# Patient Record
Sex: FEMALE | Race: WHITE | NOT HISPANIC OR LATINO | URBAN - METROPOLITAN AREA
[De-identification: names, ages, dates, MRNs, and addresses within clinical notes are randomized per-mention and may not be internally consistent; named-entity substitution may affect disease eponyms.]

---

## 2018-08-24 RX ORDER — SODIUM CHLORIDE 9 MG/ML
20 INJECTION, SOLUTION INTRAVENOUS CONTINUOUS
Status: DISCONTINUED | OUTPATIENT
Start: 2018-08-27 | End: 2018-08-30 | Stop reason: HOSPADM

## 2018-08-27 ENCOUNTER — HOSPITAL ENCOUNTER (OUTPATIENT)
Dept: INFUSION CENTER | Facility: CLINIC | Age: 33
Discharge: HOME | End: 2018-08-27
Payer: COMMERCIAL

## 2018-08-27 VITALS
RESPIRATION RATE: 16 BRPM | SYSTOLIC BLOOD PRESSURE: 138 MMHG | HEART RATE: 63 BPM | DIASTOLIC BLOOD PRESSURE: 85 MMHG | TEMPERATURE: 98.8 F

## 2018-08-27 PROCEDURE — 96365 THER/PROPH/DIAG IV INF INIT: CPT

## 2018-08-27 RX ORDER — TERBINAFINE HYDROCHLORIDE 250 MG/1
250 TABLET ORAL SEE ADMIN INSTRUCTIONS
COMMUNITY
End: 2019-03-21

## 2018-08-27 RX ORDER — BISOPROLOL FUMARATE 5 MG/1
5 TABLET ORAL DAILY
COMMUNITY
End: 2019-03-21

## 2018-08-27 RX ADMIN — SODIUM CHLORIDE 500 MG: 0.9 INJECTION, SOLUTION INTRAVENOUS at 14:21

## 2018-08-27 RX ADMIN — SODIUM CHLORIDE 20 ML/HR: 9 INJECTION, SOLUTION INTRAVENOUS at 14:20

## 2018-08-27 NOTE — PROGRESS NOTES
Tolerated IV Methyprednisolone  Has metallic taste in mouth but otherwise feels fine  Aware of next appointment   AVS given to patient

## 2018-08-27 NOTE — PROGRESS NOTES
Patient arrived on unit for first Methylprednisolone  Patient has been having issues with her eyes-pain, burning, dryness  Answered any questions prior to initiating infusion

## 2018-09-06 RX ORDER — SODIUM CHLORIDE 9 MG/ML
20 INJECTION, SOLUTION INTRAVENOUS CONTINUOUS
Status: DISCONTINUED | OUTPATIENT
Start: 2018-09-07 | End: 2018-09-10 | Stop reason: HOSPADM

## 2018-09-07 ENCOUNTER — HOSPITAL ENCOUNTER (OUTPATIENT)
Dept: INFUSION CENTER | Facility: CLINIC | Age: 33
Discharge: HOME | End: 2018-09-07
Payer: COMMERCIAL

## 2018-09-07 VITALS
RESPIRATION RATE: 16 BRPM | TEMPERATURE: 98.6 F | DIASTOLIC BLOOD PRESSURE: 88 MMHG | SYSTOLIC BLOOD PRESSURE: 136 MMHG | HEART RATE: 70 BPM

## 2018-09-07 PROCEDURE — 96365 THER/PROPH/DIAG IV INF INIT: CPT

## 2018-09-07 RX ADMIN — SODIUM CHLORIDE 500 MG: 0.9 INJECTION, SOLUTION INTRAVENOUS at 11:04

## 2018-09-07 RX ADMIN — SODIUM CHLORIDE 20 ML/HR: 0.9 INJECTION, SOLUTION INTRAVENOUS at 11:04

## 2018-09-13 RX ORDER — SODIUM CHLORIDE 9 MG/ML
20 INJECTION, SOLUTION INTRAVENOUS CONTINUOUS
Status: DISCONTINUED | OUTPATIENT
Start: 2018-09-14 | End: 2018-09-17 | Stop reason: HOSPADM

## 2018-09-14 ENCOUNTER — HOSPITAL ENCOUNTER (OUTPATIENT)
Dept: INFUSION CENTER | Facility: CLINIC | Age: 33
Discharge: HOME | End: 2018-09-14
Payer: COMMERCIAL

## 2018-09-14 VITALS
DIASTOLIC BLOOD PRESSURE: 82 MMHG | HEART RATE: 66 BPM | SYSTOLIC BLOOD PRESSURE: 123 MMHG | RESPIRATION RATE: 16 BRPM | TEMPERATURE: 98.9 F

## 2018-09-14 PROCEDURE — 96365 THER/PROPH/DIAG IV INF INIT: CPT

## 2018-09-14 RX ADMIN — SODIUM CHLORIDE 20 ML/HR: 9 INJECTION, SOLUTION INTRAVENOUS at 11:15

## 2018-09-14 RX ADMIN — SODIUM CHLORIDE 500 MG: 0.9 INJECTION, SOLUTION INTRAVENOUS at 11:20

## 2018-09-14 NOTE — PLAN OF CARE
Problem: Potential for Falls  Goal: Patient will remain free of falls  INTERVENTIONS:  - Assess patient frequently for physical needs  -  Identify cognitive and physical deficits and behaviors that affect risk of falls    -  [place] fall precautions as indicated by assessment   - Educate patient/family on patient safety including physical limitations  - Instruct patient to call for assistance with activity based on assessment  - Modify environment to reduce risk of injury  - Consider OT/PT consult to assist with strengthening/mobility   Outcome: Progressing

## 2018-09-20 RX ORDER — SODIUM CHLORIDE 9 MG/ML
20 INJECTION, SOLUTION INTRAVENOUS CONTINUOUS
Status: DISCONTINUED | OUTPATIENT
Start: 2018-09-21 | End: 2018-09-24 | Stop reason: HOSPADM

## 2018-09-21 ENCOUNTER — HOSPITAL ENCOUNTER (OUTPATIENT)
Dept: INFUSION CENTER | Facility: CLINIC | Age: 33
Discharge: HOME | End: 2018-09-21
Payer: COMMERCIAL

## 2018-09-21 VITALS
DIASTOLIC BLOOD PRESSURE: 68 MMHG | SYSTOLIC BLOOD PRESSURE: 126 MMHG | HEART RATE: 88 BPM | TEMPERATURE: 98.9 F | RESPIRATION RATE: 18 BRPM

## 2018-09-21 PROCEDURE — 96365 THER/PROPH/DIAG IV INF INIT: CPT

## 2018-09-21 RX ADMIN — SODIUM CHLORIDE 20 ML/HR: 9 INJECTION, SOLUTION INTRAVENOUS at 10:50

## 2018-09-21 RX ADMIN — SODIUM CHLORIDE 500 MG: 0.9 INJECTION, SOLUTION INTRAVENOUS at 10:50

## 2018-09-21 NOTE — PLAN OF CARE
Problem: Potential for Falls  Goal: Patient will remain free of falls  INTERVENTIONS:  - Assess patient frequently for physical needs  -  Identify cognitive and physical deficits and behaviors that affect risk of falls    -  [place] fall precautions as indicated by assessment   - Educate patient/family on patient safety including physical limitations  - Instruct patient to call for assistance with activity based on assessment  - Modify environment to reduce risk of injury  - Consider OT/PT consult to assist with strengthening/mobility   Outcome: Progressing

## 2018-09-27 RX ORDER — SODIUM CHLORIDE 9 MG/ML
20 INJECTION, SOLUTION INTRAVENOUS CONTINUOUS
Status: DISCONTINUED | OUTPATIENT
Start: 2018-09-28 | End: 2018-10-01 | Stop reason: HOSPADM

## 2018-09-28 ENCOUNTER — HOSPITAL ENCOUNTER (OUTPATIENT)
Dept: INFUSION CENTER | Facility: CLINIC | Age: 33
Discharge: HOME | End: 2018-09-28
Payer: COMMERCIAL

## 2018-09-28 VITALS
SYSTOLIC BLOOD PRESSURE: 137 MMHG | HEART RATE: 87 BPM | DIASTOLIC BLOOD PRESSURE: 87 MMHG | RESPIRATION RATE: 18 BRPM | TEMPERATURE: 97.2 F

## 2018-09-28 PROCEDURE — 96365 THER/PROPH/DIAG IV INF INIT: CPT

## 2018-09-28 RX ADMIN — SODIUM CHLORIDE 250 MG: 0.9 INJECTION, SOLUTION INTRAVENOUS at 11:12

## 2018-09-28 RX ADMIN — SODIUM CHLORIDE 20 ML/HR: 0.9 INJECTION, SOLUTION INTRAVENOUS at 11:11

## 2018-09-28 NOTE — PROGRESS NOTES
Pt arrived to unit without complaint, denies any delayed s/e from previous IV Methyl infusions  Pt received 250mg IV Metyhlprednisolone as prescribed today  Pt tolerated well without adverse reaction  AVS provided, including scheduled appts for next 3 infusions

## 2018-09-28 NOTE — PLAN OF CARE
Problem: Potential for Falls  Goal: Patient will remain free of falls  INTERVENTIONS:  - Assess patient frequently for physical needs  -  Identify cognitive and physical deficits and behaviors that affect risk of falls    -  [place] fall precautions as indicated by assessment   - Educate patient/family on patient safety including physical limitations  - Instruct patient to call for assistance with activity based on assessment  - Modify environment to reduce risk of injury  - Consider OT/PT consult to assist with strengthening/mobility   Outcome: Progressing

## 2018-10-04 RX ORDER — SODIUM CHLORIDE 9 MG/ML
20 INJECTION, SOLUTION INTRAVENOUS CONTINUOUS
Status: DISCONTINUED | OUTPATIENT
Start: 2018-10-05 | End: 2018-10-08 | Stop reason: HOSPADM

## 2018-10-05 ENCOUNTER — HOSPITAL ENCOUNTER (OUTPATIENT)
Dept: INFUSION CENTER | Facility: CLINIC | Age: 33
Discharge: HOME | End: 2018-10-05
Payer: COMMERCIAL

## 2018-10-05 PROCEDURE — 96365 THER/PROPH/DIAG IV INF INIT: CPT

## 2018-10-05 RX ADMIN — SODIUM CHLORIDE 20 ML/HR: 0.9 INJECTION, SOLUTION INTRAVENOUS at 14:53

## 2018-10-05 RX ADMIN — SODIUM CHLORIDE 250 MG: 0.9 INJECTION, SOLUTION INTRAVENOUS at 14:53

## 2018-10-05 NOTE — PLAN OF CARE
Problem: Potential for Falls  Goal: Patient will remain free of falls  INTERVENTIONS:  - Assess patient frequently for physical needs  -  Identify cognitive and physical deficits and behaviors that affect risk of falls    -  [place] fall precautions as indicated by assessment   - Educate patient/family on patient safety including physical limitations  - Instruct patient to call for assistance with activity based on assessment  - Modify environment to reduce risk of injury  - Consider OT/PT consult to assist with strengthening/mobility   Outcome: Progressing

## 2018-10-05 NOTE — PROGRESS NOTES
Pt without complaint, tolerated IV Methyl well without adverse reaction, left unit in stable condition without question or concern  Pt declines AVS today

## 2018-10-11 RX ORDER — SODIUM CHLORIDE 9 MG/ML
20 INJECTION, SOLUTION INTRAVENOUS CONTINUOUS
Status: DISCONTINUED | OUTPATIENT
Start: 2018-10-12 | End: 2018-10-15 | Stop reason: HOSPADM

## 2018-10-12 ENCOUNTER — HOSPITAL ENCOUNTER (OUTPATIENT)
Dept: INFUSION CENTER | Facility: CLINIC | Age: 33
Discharge: HOME | End: 2018-10-12
Payer: COMMERCIAL

## 2018-10-12 VITALS
DIASTOLIC BLOOD PRESSURE: 75 MMHG | SYSTOLIC BLOOD PRESSURE: 149 MMHG | RESPIRATION RATE: 18 BRPM | HEART RATE: 61 BPM | TEMPERATURE: 99.1 F

## 2018-10-12 PROCEDURE — 96365 THER/PROPH/DIAG IV INF INIT: CPT

## 2018-10-12 RX ADMIN — SODIUM CHLORIDE 250 MG: 0.9 INJECTION, SOLUTION INTRAVENOUS at 14:35

## 2018-10-12 RX ADMIN — SODIUM CHLORIDE 20 ML/HR: 0.9 INJECTION, SOLUTION INTRAVENOUS at 14:35

## 2018-10-18 RX ORDER — SODIUM CHLORIDE 9 MG/ML
20 INJECTION, SOLUTION INTRAVENOUS CONTINUOUS
Status: DISCONTINUED | OUTPATIENT
Start: 2018-10-19 | End: 2018-10-22 | Stop reason: HOSPADM

## 2018-10-19 ENCOUNTER — HOSPITAL ENCOUNTER (OUTPATIENT)
Dept: INFUSION CENTER | Facility: CLINIC | Age: 33
Discharge: HOME | End: 2018-10-19
Payer: COMMERCIAL

## 2018-10-19 VITALS
SYSTOLIC BLOOD PRESSURE: 128 MMHG | DIASTOLIC BLOOD PRESSURE: 92 MMHG | RESPIRATION RATE: 18 BRPM | HEART RATE: 80 BPM | TEMPERATURE: 98.3 F

## 2018-10-19 PROCEDURE — 96365 THER/PROPH/DIAG IV INF INIT: CPT

## 2018-10-19 RX ADMIN — SODIUM CHLORIDE 20 ML/HR: 9 INJECTION, SOLUTION INTRAVENOUS at 15:10

## 2018-10-19 RX ADMIN — SODIUM CHLORIDE 250 MG: 0.9 INJECTION, SOLUTION INTRAVENOUS at 15:16

## 2018-10-19 NOTE — PLAN OF CARE
Problem: Potential for Falls  Goal: Patient will remain free of falls  INTERVENTIONS:  - Assess patient frequently for physical needs  -  Identify cognitive and physical deficits and behaviors that affect risk of falls    -  [place] fall precautions as indicated by assessment   - Educate patient/family on patient safety including physical limitations  - Instruct patient to call for assistance with activity based on assessment  - Modify environment to reduce risk of injury  - Consider OT/PT consult to assist with strengthening/mobility   Outcome: Progressing

## 2018-10-19 NOTE — PROGRESS NOTES
Patient tolerated infusion well  Denies any discomfort  Pt does not have future infusion appointments at this time

## 2019-04-19 ENCOUNTER — EVALUATION (OUTPATIENT)
Dept: PHYSICAL THERAPY | Facility: REHABILITATION | Age: 34
End: 2019-04-19
Payer: COMMERCIAL

## 2019-04-19 DIAGNOSIS — R42 DIZZINESS: Primary | ICD-10-CM

## 2019-04-19 PROCEDURE — 97162 PT EVAL MOD COMPLEX 30 MIN: CPT

## 2019-05-03 ENCOUNTER — TELEPHONE (OUTPATIENT)
Dept: NEUROLOGY | Facility: CLINIC | Age: 34
End: 2019-05-03

## 2019-05-03 ENCOUNTER — OFFICE VISIT (OUTPATIENT)
Dept: NEUROLOGY | Facility: CLINIC | Age: 34
End: 2019-05-03
Payer: COMMERCIAL

## 2019-05-03 VITALS
HEIGHT: 66 IN | RESPIRATION RATE: 16 BRPM | BODY MASS INDEX: 19.77 KG/M2 | WEIGHT: 123 LBS | HEART RATE: 77 BPM | DIASTOLIC BLOOD PRESSURE: 75 MMHG | SYSTOLIC BLOOD PRESSURE: 152 MMHG

## 2019-05-03 DIAGNOSIS — R53.1 GENERALIZED WEAKNESS: ICD-10-CM

## 2019-05-03 DIAGNOSIS — E05.00 GRAVES DISEASE: ICD-10-CM

## 2019-05-03 DIAGNOSIS — H53.9 VISION CHANGES: ICD-10-CM

## 2019-05-03 DIAGNOSIS — R26.89 IMBALANCE: Primary | ICD-10-CM

## 2019-05-03 DIAGNOSIS — R25.3 MUSCLE TWITCHING: ICD-10-CM

## 2019-05-03 PROCEDURE — 99204 OFFICE O/P NEW MOD 45 MIN: CPT | Performed by: PSYCHIATRY & NEUROLOGY

## 2019-05-03 RX ORDER — GABAPENTIN 300 MG/1
300 CAPSULE ORAL
Qty: 30 CAPSULE | Refills: 0 | Status: SHIPPED | OUTPATIENT
Start: 2019-05-03

## 2019-05-08 ENCOUNTER — DOCUMENTATION (OUTPATIENT)
Dept: NEUROLOGY | Facility: CLINIC | Age: 34
End: 2019-05-08

## 2019-05-16 RX ORDER — CLINDAMYCIN PHOSPHATE 10 UG/ML
LOTION TOPICAL
COMMUNITY

## 2019-05-16 RX ORDER — LEVOTHYROXINE AND LIOTHYRONINE 19; 4.5 UG/1; UG/1
TABLET ORAL
COMMUNITY
End: 2019-05-17 | Stop reason: ALTCHOICE

## 2019-05-16 RX ORDER — DEXLANSOPRAZOLE 60 MG/1
CAPSULE, DELAYED RELEASE ORAL
COMMUNITY
End: 2019-05-17 | Stop reason: ALTCHOICE

## 2019-05-16 RX ORDER — TINIDAZOLE 500 MG/1
TABLET ORAL
COMMUNITY
End: 2019-05-17 | Stop reason: ALTCHOICE

## 2019-05-16 RX ORDER — LEVOTHYROXINE SODIUM 75 UG/1
CAPSULE ORAL
Refills: 0 | COMMUNITY
Start: 2019-04-26

## 2019-05-16 RX ORDER — FOLIC/B6/B12/OM3/DHA/EPA/BSITO 1-12.5-0.5
CAPSULE ORAL
Refills: 0 | COMMUNITY
Start: 2019-04-23 | End: 2019-05-17 | Stop reason: ALTCHOICE

## 2019-05-16 RX ORDER — TERBINAFINE HYDROCHLORIDE 250 MG/1
TABLET ORAL
COMMUNITY
End: 2019-05-17 | Stop reason: ALTCHOICE

## 2019-05-16 RX ORDER — LIDOCAINE AND TETRACAINE 70; 70 MG/G; MG/G
CREAM TOPICAL
COMMUNITY
Start: 2018-10-02 | End: 2019-05-17 | Stop reason: ALTCHOICE

## 2019-05-16 RX ORDER — ERYTHROMYCIN 5 MG/G
OINTMENT OPHTHALMIC
COMMUNITY
Start: 2018-11-01 | End: 2019-05-17 | Stop reason: ALTCHOICE

## 2019-05-16 RX ORDER — NEOMYCIN SULFATE, POLYMYXIN B SULFATE AND GRAMICIDIN 1.75; 10000; .025 MG/ML; [USP'U]/ML; MG/ML
SOLUTION/ DROPS OPHTHALMIC
COMMUNITY
Start: 2018-09-16 | End: 2019-05-17 | Stop reason: ALTCHOICE

## 2019-05-16 RX ORDER — METHIMAZOLE 5 MG/1
TABLET ORAL
COMMUNITY
End: 2019-05-17 | Stop reason: ALTCHOICE

## 2019-05-16 RX ORDER — LEVOFLOXACIN 750 MG/1
TABLET ORAL
Refills: 0 | COMMUNITY
Start: 2019-03-30 | End: 2019-05-17 | Stop reason: ALTCHOICE

## 2019-05-16 RX ORDER — PREDNISONE 10 MG/1
TABLET ORAL
COMMUNITY
End: 2019-05-17 | Stop reason: ALTCHOICE

## 2019-05-16 RX ORDER — BETAMETHASONE DIPROPIONATE 0.5 MG/G
OINTMENT TOPICAL
COMMUNITY
End: 2019-05-17 | Stop reason: ALTCHOICE

## 2019-05-16 RX ORDER — OFLOXACIN 3 MG/ML
SOLUTION/ DROPS OPHTHALMIC
COMMUNITY
End: 2019-05-17 | Stop reason: ALTCHOICE

## 2019-05-16 RX ORDER — FENOPROFEN CALCIUM 200 MG/1
CAPSULE ORAL
COMMUNITY
End: 2019-05-17 | Stop reason: ALTCHOICE

## 2019-05-16 RX ORDER — DICLOFENAC POTASSIUM 50 MG/1
TABLET, FILM COATED ORAL
COMMUNITY
End: 2019-05-17 | Stop reason: ALTCHOICE

## 2019-05-17 ENCOUNTER — OFFICE VISIT (OUTPATIENT)
Dept: UROLOGY | Facility: MEDICAL CENTER | Age: 34
End: 2019-05-17
Payer: COMMERCIAL

## 2019-05-17 ENCOUNTER — TELEPHONE (OUTPATIENT)
Dept: UROLOGY | Facility: MEDICAL CENTER | Age: 34
End: 2019-05-17

## 2019-05-17 VITALS
DIASTOLIC BLOOD PRESSURE: 60 MMHG | SYSTOLIC BLOOD PRESSURE: 100 MMHG | BODY MASS INDEX: 19.61 KG/M2 | HEART RATE: 100 BPM | HEIGHT: 66 IN | WEIGHT: 122 LBS

## 2019-05-17 DIAGNOSIS — R31.9 HEMATURIA, UNSPECIFIED TYPE: Primary | ICD-10-CM

## 2019-05-17 DIAGNOSIS — N30.90 CYSTITIS: ICD-10-CM

## 2019-05-17 PROCEDURE — 99203 OFFICE O/P NEW LOW 30 MIN: CPT | Performed by: UROLOGY

## 2019-05-17 RX ORDER — NITROFURANTOIN 25; 75 MG/1; MG/1
100 CAPSULE ORAL 2 TIMES DAILY
Qty: 10 CAPSULE | Refills: 0 | Status: SHIPPED | OUTPATIENT
Start: 2019-05-17

## 2019-05-17 RX ORDER — FLUCONAZOLE 200 MG/1
TABLET ORAL
Refills: 0 | COMMUNITY
Start: 2019-05-13

## 2019-05-17 RX ORDER — DOXYCYCLINE HYCLATE 100 MG/1
100 CAPSULE ORAL 2 TIMES DAILY
Refills: 0 | COMMUNITY
Start: 2019-05-13

## 2019-05-17 RX ORDER — CYCLOSPORINE 0.5 MG/ML
EMULSION OPHTHALMIC
Refills: 0 | COMMUNITY
Start: 2019-05-10

## 2019-06-10 ENCOUNTER — TELEPHONE (OUTPATIENT)
Dept: UROLOGY | Facility: MEDICAL CENTER | Age: 34
End: 2019-06-10

## 2019-06-13 ENCOUNTER — TELEPHONE (OUTPATIENT)
Dept: UROLOGY | Facility: MEDICAL CENTER | Age: 34
End: 2019-06-13

## 2021-03-15 ENCOUNTER — TELEPHONE (OUTPATIENT)
Dept: NEUROLOGY | Facility: CLINIC | Age: 36
End: 2021-03-15

## 2021-03-15 NOTE — TELEPHONE ENCOUNTER
1st Attempt  Left message to reschedule 04/01 appointment with Dr Rosalind Sterling  She will be covering residency that day       06/02 & 06/03 Held for reschedules

## 2021-03-25 NOTE — TELEPHONE ENCOUNTER
Called patient LVM to patient that her appointment has been moved to 4/22/2021 with Jennfier Fuentes @ 8:30am in [place] location  I also informed the patient if  appointment date time and location does not work for her to call back to reschedule

## 2021-05-21 ENCOUNTER — TRANSCRIBE ORDERS (OUTPATIENT)
Dept: SCHEDULING | Age: 36
End: 2021-05-21

## 2021-05-21 DIAGNOSIS — Z01.818 ENCOUNTER FOR OTHER PREPROCEDURAL EXAMINATION: Primary | ICD-10-CM

## 2021-05-22 ENCOUNTER — APPOINTMENT (OUTPATIENT)
Dept: LAB | Age: 36
End: 2021-05-22
Attending: OPHTHALMOLOGY
Payer: COMMERCIAL

## 2021-05-22 DIAGNOSIS — Z01.818 ENCOUNTER FOR OTHER PREPROCEDURAL EXAMINATION: ICD-10-CM

## 2021-05-22 PROCEDURE — U0003 INFECTIOUS AGENT DETECTION BY NUCLEIC ACID (DNA OR RNA); SEVERE ACUTE RESPIRATORY SYNDROME CORONAVIRUS 2 (SARS-COV-2) (CORONAVIRUS DISEASE [COVID-19]), AMPLIFIED PROBE TECHNIQUE, MAKING USE OF HIGH THROUGHPUT TECHNOLOGIES AS DESCRIBED BY CMS-2020-01-R: HCPCS

## 2021-05-23 LAB — SARS-COV-2 RNA RESP QL NAA+PROBE: NEGATIVE

## 2021-05-24 ENCOUNTER — ANESTHESIA EVENT (OUTPATIENT)
Dept: SURGERY | Facility: HOSPITAL | Age: 36
Setting detail: HOSPITAL OUTPATIENT SURGERY
End: 2021-05-24
Payer: COMMERCIAL

## 2021-05-24 NOTE — ANESTHESIA PREPROCEDURE EVALUATION
"Relevant Problems   No relevant active problems       Anesthesia ROS/MED HX    Anesthesia History - neg  Pulmonary - neg  Neuro/Psych - neg  Cardiovascular- neg   Covid19 Test Reviewed  Comments: Ecchymosis below L eye  Hematological - neg  GI/Hepatic- neg  Musculoskeletal- neg  Renal Disease- neg  Endo/Other   Hypothyroidism (s/p ablation for Graves)       There is no problem list on file for this patient.       Past Medical History:   Diagnosis Date   • Graves disease    • Thyroid eye disease        Past Surgical History:   Procedure Laterality Date   • ORBITAL RECONSTRUCTION     • POLYPECTOMY         Allergies   Allergen Reactions   • Epinephrine Palpitations   • Vancomycin Rash       Current Facility-Administered Medications   Medication Dose Route Frequency   • clindamycin  600 mg intravenous Once   • sodium chloride 0.9 %  80 mL/hr intravenous Continuous       Prior to Admission medications    Medication Sig Start Date End Date Taking? Authorizing Provider   levothyroxine sodium (TIROSINT) 75 mcg capsule Take 75 mcg by mouth daily.   Yes ProviderNancy MD   liothyronine (CYTOMEL) 25 mcg tablet Take 25 mcg by mouth daily.   Yes Provider, MD Nancy       Vitals:    05/26/21 1258 05/26/21 1259   BP: (!) 148/61    Pulse: 81    Resp: 18    Temp: 36.6 °C (97.8 °F)    TempSrc: Temporal    SpO2: 100%    Weight:  59.4 kg (131 lb)   Height:  1.651 m (5' 5\")       BP Readings from Last 3 Encounters:   05/26/21 (!) 148/61       CBC Results    No lab values to display.         BMP Results    No lab values to display.               The patient does not have an active anticoagulation episode.    Lab Results   Component Value Date    HCGPREGUR Negative 05/26/2021       No orders to display           Physical Exam    Airway   Mallampati: II   TM distance: >3 FB   Neck ROM: full  Cardiovascular - normal   Rhythm: regular   Rate: normalPulmonary - normal  Other Findings   Ecchymosis below L eye  Dental - " normal        Anesthesia Plan    Plan: MAC    Technique: MAC     Lines and Monitors: PIV     Airway: natural airway / supplemental oxygen   ASA 2  Blood Products:   Use of Blood Products Discussed: No   Anesthetic plan and risks discussed with: patient  Induction:    intravenous   Postop Plan:   Patient Disposition: phase II then home   Pain Management: IV analgesics  Comments:    Plan: Consented for both MAC, and GA back-up. Patient understands that as part of MAC, it is possible to have experience of transient awareness under anesthesia. Discussed associated risks including, but not limited to, sore throat, nausea/vomiting, oral injury, unexpected drug reactions, big swings in blood pressure, pulmonary complications, heart attack, stroke, and death.   Patient advised of risks, benefits, and alternatives, and all questions/concerns answered.

## 2021-05-26 ENCOUNTER — ANESTHESIA (OUTPATIENT)
Dept: SURGERY | Facility: HOSPITAL | Age: 36
Setting detail: HOSPITAL OUTPATIENT SURGERY
End: 2021-05-26
Payer: COMMERCIAL

## 2021-05-26 ENCOUNTER — HOSPITAL ENCOUNTER (OUTPATIENT)
Facility: HOSPITAL | Age: 36
Setting detail: HOSPITAL OUTPATIENT SURGERY
Discharge: HOME | End: 2021-05-26
Attending: OPHTHALMOLOGY | Admitting: OPHTHALMOLOGY
Payer: COMMERCIAL

## 2021-05-26 VITALS
HEIGHT: 65 IN | RESPIRATION RATE: 20 BRPM | OXYGEN SATURATION: 99 % | HEART RATE: 71 BPM | DIASTOLIC BLOOD PRESSURE: 55 MMHG | TEMPERATURE: 97 F | WEIGHT: 131 LBS | BODY MASS INDEX: 21.83 KG/M2 | SYSTOLIC BLOOD PRESSURE: 95 MMHG

## 2021-05-26 LAB
B-HCG UR QL: NEGATIVE
POCT TEST: NORMAL

## 2021-05-26 PROCEDURE — 08BNXZZ EXCISION OF RIGHT UPPER EYELID, EXTERNAL APPROACH: ICD-10-PCS | Performed by: OPHTHALMOLOGY

## 2021-05-26 PROCEDURE — 36000013 HC OR LEVEL 3 EA ADDL MIN

## 2021-05-26 PROCEDURE — 63700000 HC SELF-ADMINISTRABLE DRUG: Performed by: OPHTHALMOLOGY

## 2021-05-26 PROCEDURE — 71000011 HC PACU PHASE 1 EA ADDL MIN: Performed by: OPHTHALMOLOGY

## 2021-05-26 PROCEDURE — 08SRXZZ REPOSITION LEFT LOWER EYELID, EXTERNAL APPROACH: ICD-10-PCS | Performed by: OPHTHALMOLOGY

## 2021-05-26 PROCEDURE — 37000002 HC ANESTHESIA MAC: Performed by: OPHTHALMOLOGY

## 2021-05-26 PROCEDURE — 27200000 HC STERILE SUPPLY: Performed by: OPHTHALMOLOGY

## 2021-05-26 PROCEDURE — 25000000 HC PHARMACY GENERAL: Performed by: OPHTHALMOLOGY

## 2021-05-26 PROCEDURE — 63600000 HC DRUGS/DETAIL CODE: Performed by: OPHTHALMOLOGY

## 2021-05-26 PROCEDURE — 08RQX7Z REPLACEMENT OF RIGHT LOWER EYELID WITH AUTOLOGOUS TISSUE SUBSTITUTE, EXTERNAL APPROACH: ICD-10-PCS | Performed by: OPHTHALMOLOGY

## 2021-05-26 PROCEDURE — 08BPXZZ EXCISION OF LEFT UPPER EYELID, EXTERNAL APPROACH: ICD-10-PCS | Performed by: OPHTHALMOLOGY

## 2021-05-26 PROCEDURE — 63600000 HC DRUGS/DETAIL CODE: Performed by: NURSE ANESTHETIST, CERTIFIED REGISTERED

## 2021-05-26 PROCEDURE — 36000013 HC OR LEVEL 3 EA ADDL MIN: Performed by: OPHTHALMOLOGY

## 2021-05-26 PROCEDURE — 25000000 HC PHARMACY GENERAL: Performed by: NURSE ANESTHETIST, CERTIFIED REGISTERED

## 2021-05-26 PROCEDURE — 71000002 HC PACU PHASE 2 INITIAL 30MIN: Performed by: OPHTHALMOLOGY

## 2021-05-26 PROCEDURE — 36000003 HC OR LEVEL 3 INITIAL 30MIN: Performed by: OPHTHALMOLOGY

## 2021-05-26 PROCEDURE — 08SQXZZ REPOSITION RIGHT LOWER EYELID, EXTERNAL APPROACH: ICD-10-PCS | Performed by: OPHTHALMOLOGY

## 2021-05-26 PROCEDURE — 080N0ZZ ALTERATION OF RIGHT UPPER EYELID, OPEN APPROACH: ICD-10-PCS | Performed by: OPHTHALMOLOGY

## 2021-05-26 PROCEDURE — 08RRX7Z REPLACEMENT OF LEFT LOWER EYELID WITH AUTOLOGOUS TISSUE SUBSTITUTE, EXTERNAL APPROACH: ICD-10-PCS | Performed by: OPHTHALMOLOGY

## 2021-05-26 PROCEDURE — 25800000 HC PHARMACY IV SOLUTIONS: Performed by: STUDENT IN AN ORGANIZED HEALTH CARE EDUCATION/TRAINING PROGRAM

## 2021-05-26 PROCEDURE — 080P0ZZ ALTERATION OF LEFT UPPER EYELID, OPEN APPROACH: ICD-10-PCS | Performed by: OPHTHALMOLOGY

## 2021-05-26 PROCEDURE — 71000012 HC PACU PHASE 2 EA ADDL MIN: Performed by: OPHTHALMOLOGY

## 2021-05-26 PROCEDURE — 71000001 HC PACU PHASE 1 INITIAL 30MIN: Performed by: OPHTHALMOLOGY

## 2021-05-26 PROCEDURE — 3E013GC INTRODUCTION OF OTHER THERAPEUTIC SUBSTANCE INTO SUBCUTANEOUS TISSUE, PERCUTANEOUS APPROACH: ICD-10-PCS | Performed by: OPHTHALMOLOGY

## 2021-05-26 DEVICE — IMPLANTABLE DEVICE: Type: IMPLANTABLE DEVICE | Status: FUNCTIONAL

## 2021-05-26 RX ORDER — IBUPROFEN 200 MG
16-32 TABLET ORAL AS NEEDED
Status: DISCONTINUED | OUTPATIENT
Start: 2021-05-26 | End: 2021-05-26 | Stop reason: HOSPADM

## 2021-05-26 RX ORDER — DEXAMETHASONE SODIUM PHOSPHATE 4 MG/ML
INJECTION, SOLUTION INTRA-ARTICULAR; INTRALESIONAL; INTRAMUSCULAR; INTRAVENOUS; SOFT TISSUE AS NEEDED
Status: DISCONTINUED | OUTPATIENT
Start: 2021-05-26 | End: 2021-05-26 | Stop reason: SURG

## 2021-05-26 RX ORDER — MIDAZOLAM HYDROCHLORIDE 2 MG/2ML
INJECTION, SOLUTION INTRAMUSCULAR; INTRAVENOUS AS NEEDED
Status: DISCONTINUED | OUTPATIENT
Start: 2021-05-26 | End: 2021-05-26 | Stop reason: SURG

## 2021-05-26 RX ORDER — FENTANYL CITRATE 50 UG/ML
50 INJECTION, SOLUTION INTRAMUSCULAR; INTRAVENOUS
Status: DISCONTINUED | OUTPATIENT
Start: 2021-05-26 | End: 2021-05-26 | Stop reason: HOSPADM

## 2021-05-26 RX ORDER — ONDANSETRON HYDROCHLORIDE 2 MG/ML
4 INJECTION, SOLUTION INTRAVENOUS
Status: DISCONTINUED | OUTPATIENT
Start: 2021-05-26 | End: 2021-05-26 | Stop reason: HOSPADM

## 2021-05-26 RX ORDER — PROPOFOL 200MG/20ML
SYRINGE (ML) INTRAVENOUS AS NEEDED
Status: DISCONTINUED | OUTPATIENT
Start: 2021-05-26 | End: 2021-05-26 | Stop reason: SURG

## 2021-05-26 RX ORDER — DEXAMETHASONE SODIUM PHOSPHATE 10 MG/ML
INJECTION INTRAMUSCULAR; INTRAVENOUS AS NEEDED
Status: DISCONTINUED | OUTPATIENT
Start: 2021-05-26 | End: 2021-05-26 | Stop reason: HOSPADM

## 2021-05-26 RX ORDER — PROPARACAINE HYDROCHLORIDE 5 MG/ML
SOLUTION/ DROPS OPHTHALMIC AS NEEDED
Status: DISCONTINUED | OUTPATIENT
Start: 2021-05-26 | End: 2021-05-26 | Stop reason: HOSPADM

## 2021-05-26 RX ORDER — LIDOCAINE HCL/EPINEPHRINE/PF 2%-1:200K
VIAL (ML) INJECTION AS NEEDED
Status: DISCONTINUED | OUTPATIENT
Start: 2021-05-26 | End: 2021-05-26 | Stop reason: HOSPADM

## 2021-05-26 RX ORDER — HYDROCODONE BITARTRATE AND ACETAMINOPHEN 5; 325 MG/1; MG/1
1 TABLET ORAL ONCE AS NEEDED
Status: DISCONTINUED | OUTPATIENT
Start: 2021-05-26 | End: 2021-05-26 | Stop reason: HOSPADM

## 2021-05-26 RX ORDER — SODIUM CHLORIDE 9 MG/ML
80 INJECTION, SOLUTION INTRAVENOUS CONTINUOUS
Status: DISCONTINUED | OUTPATIENT
Start: 2021-05-26 | End: 2021-05-26 | Stop reason: HOSPADM

## 2021-05-26 RX ORDER — DEXTROSE 40 %
15-30 GEL (GRAM) ORAL AS NEEDED
Status: DISCONTINUED | OUTPATIENT
Start: 2021-05-26 | End: 2021-05-26 | Stop reason: HOSPADM

## 2021-05-26 RX ORDER — PROPOFOL 10 MG/ML
INJECTION, EMULSION INTRAVENOUS CONTINUOUS PRN
Status: DISCONTINUED | OUTPATIENT
Start: 2021-05-26 | End: 2021-05-26 | Stop reason: SURG

## 2021-05-26 RX ORDER — CLINDAMYCIN PHOSPHATE 600 MG/50ML
600 INJECTION, SOLUTION INTRAVENOUS ONCE
Status: COMPLETED | OUTPATIENT
Start: 2021-05-26 | End: 2021-05-26

## 2021-05-26 RX ORDER — LIDOCAINE HYDROCHLORIDE 10 MG/ML
INJECTION, SOLUTION EPIDURAL; INFILTRATION; INTRACAUDAL; PERINEURAL AS NEEDED
Status: DISCONTINUED | OUTPATIENT
Start: 2021-05-26 | End: 2021-05-26 | Stop reason: SURG

## 2021-05-26 RX ORDER — BACITRACIN ZINC AND POLYMYXIN B SULFATE 500; 10000 [USP'U]/G; [USP'U]/G
OINTMENT OPHTHALMIC AS NEEDED
Status: DISCONTINUED | OUTPATIENT
Start: 2021-05-26 | End: 2021-05-26 | Stop reason: HOSPADM

## 2021-05-26 RX ORDER — ONDANSETRON HYDROCHLORIDE 2 MG/ML
INJECTION, SOLUTION INTRAVENOUS AS NEEDED
Status: DISCONTINUED | OUTPATIENT
Start: 2021-05-26 | End: 2021-05-26 | Stop reason: SURG

## 2021-05-26 RX ORDER — BUPIVACAINE HYDROCHLORIDE 5 MG/ML
INJECTION, SOLUTION EPIDURAL; INTRACAUDAL AS NEEDED
Status: DISCONTINUED | OUTPATIENT
Start: 2021-05-26 | End: 2021-05-26 | Stop reason: HOSPADM

## 2021-05-26 RX ORDER — DEXTROSE 50 % IN WATER (D50W) INTRAVENOUS SYRINGE
25 AS NEEDED
Status: DISCONTINUED | OUTPATIENT
Start: 2021-05-26 | End: 2021-05-26 | Stop reason: HOSPADM

## 2021-05-26 RX ORDER — FENTANYL CITRATE 50 UG/ML
INJECTION, SOLUTION INTRAMUSCULAR; INTRAVENOUS AS NEEDED
Status: DISCONTINUED | OUTPATIENT
Start: 2021-05-26 | End: 2021-05-26 | Stop reason: SURG

## 2021-05-26 RX ORDER — GLYCOPYRROLATE 0.6MG/3ML
SYRINGE (ML) INTRAVENOUS AS NEEDED
Status: DISCONTINUED | OUTPATIENT
Start: 2021-05-26 | End: 2021-05-26 | Stop reason: SURG

## 2021-05-26 RX ADMIN — PROPOFOL 20 MG: 10 INJECTION, EMULSION INTRAVENOUS at 14:30

## 2021-05-26 RX ADMIN — PROPOFOL 50 MG: 10 INJECTION, EMULSION INTRAVENOUS at 15:35

## 2021-05-26 RX ADMIN — MIDAZOLAM HYDROCHLORIDE 1 MG: 1 INJECTION, SOLUTION INTRAMUSCULAR; INTRAVENOUS at 14:33

## 2021-05-26 RX ADMIN — ONDANSETRON HYDROCHLORIDE 4 MG: 2 SOLUTION INTRAMUSCULAR; INTRAVENOUS at 16:14

## 2021-05-26 RX ADMIN — FENTANYL CITRATE 25 MCG: 50 INJECTION, SOLUTION INTRAMUSCULAR; INTRAVENOUS at 15:55

## 2021-05-26 RX ADMIN — FENTANYL CITRATE 50 MCG: 50 INJECTION, SOLUTION INTRAMUSCULAR; INTRAVENOUS at 14:27

## 2021-05-26 RX ADMIN — MIDAZOLAM HYDROCHLORIDE 1 MG: 1 INJECTION, SOLUTION INTRAMUSCULAR; INTRAVENOUS at 15:02

## 2021-05-26 RX ADMIN — PROPOFOL 30 MG: 10 INJECTION, EMULSION INTRAVENOUS at 14:33

## 2021-05-26 RX ADMIN — PROPOFOL 20 MG: 10 INJECTION, EMULSION INTRAVENOUS at 16:01

## 2021-05-26 RX ADMIN — DEXAMETHASONE SODIUM PHOSPHATE 8 MG: 4 INJECTION, SOLUTION INTRAMUSCULAR; INTRAVENOUS at 14:28

## 2021-05-26 RX ADMIN — MIDAZOLAM HYDROCHLORIDE 2 MG: 1 INJECTION, SOLUTION INTRAMUSCULAR; INTRAVENOUS at 14:20

## 2021-05-26 RX ADMIN — FENTANYL CITRATE 50 MCG: 50 INJECTION, SOLUTION INTRAMUSCULAR; INTRAVENOUS at 14:30

## 2021-05-26 RX ADMIN — PROPOFOL 20 MG: 10 INJECTION, EMULSION INTRAVENOUS at 16:12

## 2021-05-26 RX ADMIN — SODIUM CHLORIDE 80 ML/HR: 9 INJECTION, SOLUTION INTRAVENOUS at 13:22

## 2021-05-26 RX ADMIN — PROPOFOL INJECTABLE EMULSION 20 MCG/KG/MIN: 10 INJECTION, EMULSION INTRAVENOUS at 15:03

## 2021-05-26 RX ADMIN — LIDOCAINE HYDROCHLORIDE 5 ML: 10 INJECTION, SOLUTION EPIDURAL; INFILTRATION; INTRACAUDAL; PERINEURAL at 14:28

## 2021-05-26 RX ADMIN — PROPOFOL 40 MG: 10 INJECTION, EMULSION INTRAVENOUS at 15:04

## 2021-05-26 RX ADMIN — CLINDAMYCIN IN 5 PERCENT DEXTROSE 600 MG: 12 INJECTION, SOLUTION INTRAVENOUS at 14:29

## 2021-05-26 RX ADMIN — FENTANYL CITRATE 50 MCG: 50 INJECTION, SOLUTION INTRAMUSCULAR; INTRAVENOUS at 15:36

## 2021-05-26 RX ADMIN — PROPOFOL 30 MG: 10 INJECTION, EMULSION INTRAVENOUS at 14:46

## 2021-05-26 RX ADMIN — Medication 0.1 MG: at 14:20

## 2021-05-26 RX ADMIN — PROPOFOL 10 MG: 10 INJECTION, EMULSION INTRAVENOUS at 15:55

## 2021-05-26 RX ADMIN — PROPOFOL 20 MG: 10 INJECTION, EMULSION INTRAVENOUS at 14:38

## 2021-05-26 RX ADMIN — PROPOFOL 40 MG: 10 INJECTION, EMULSION INTRAVENOUS at 14:28

## 2021-05-26 RX ADMIN — PROPOFOL 30 MG: 10 INJECTION, EMULSION INTRAVENOUS at 15:02

## 2021-05-26 RX ADMIN — PROPOFOL 20 MG: 10 INJECTION, EMULSION INTRAVENOUS at 14:39

## 2021-05-26 RX ADMIN — FENTANYL CITRATE 25 MCG: 50 INJECTION, SOLUTION INTRAMUSCULAR; INTRAVENOUS at 15:49

## 2021-05-26 RX ADMIN — PROPOFOL 30 MG: 10 INJECTION, EMULSION INTRAVENOUS at 14:35

## 2021-05-26 RX ADMIN — PROPOFOL 30 MG: 10 INJECTION, EMULSION INTRAVENOUS at 16:06

## 2021-05-26 NOTE — OP NOTE
Patient: Olya Barrow  Medical Record Number: [id number]  Date of Surgery: 5/26/2021   Surgeon(s) and Role:     * Li Vargas MD - Primary     * Suraj Jefferson DO - Resident - Assisting    Anesthesia:   Monitor Anesthesia Care  Anesthesiologist: Jailyn Holliday MD  CRNA: Christine Alva CRNA    Pre-operative diagnosis:  Pre-op Diagnosis   1) Thyroid eye disease   2) bilateral upper eyelid retraction    3) Bilateral lower eyelid cicatricial ectropion   4) Bilateral upper eyelid lash ptosis   5) Visually significant submental fat    Post Operative diagnosis: Same    Procedures:  Lid retraction repair transconj BUL marginal rotation BUL, cautery, Bilateral  Canthoplasty BLL, Bilateral  Ectropion repair BLL with alloderm graft , kybella and volure inj  peel, Bilateral      EBL: <1cc    Complications: None    Indication: The patient has Thyroid eye disease, cicatricial  ectropion of both  lower eyelids, bilateral upper eyelid retraction, bilateral upper eyelid lash ptosis, and submental fat causing cosmetic disfigurement.      Procedure: In the pre-operative holding area, the risks, benefits, and alternatives of the above procedures were again discussed in detail with the patient. All questions were answered. Informed consent was obtained and placed into the medical record.     In the operating room, the patient was identified by name and date of birth. Monitored anesthesia care was initiated by the anesthesiology team, and the patient was marked and prepped in the usual fashion for ophthalmic plastic surgery. Approximately 2cc of local anesthetic solution (A 50/50 mixture of 2% lidocaine with 1:100,000 epinephrine and 0.5% bupivicaine) was injected into both upper and lower eyelids.     Attention was turned to the right upper eyelid.  This was everted over a Desmarres retractor and the conjunctiva superior to the tarsus was incised with Davy scissors.  Multiple adhesions were lysed.  Eyelid height  and contour was then judged with patient with primary physician.  The exact same procedure was then performed the left side.  Adjustments were made to IV both sides until appropriate height and contour was established.    Next the cicatricial ectropion of both lower eyelids was addressed start with the right lower eyelid. A scleral shell was placed in the eye and a Frost sutures was placed with 4-0 silk through the lower eyelid.  The lid was everted  over a Demarres and the inferior peripheral arcade was cauterized with bipolar cautery and then incised using Davy scissors.  An approximately 5 mm x 30 mm area was exposed of the lower conjunctiva.  A 5m x 30 mm graft was then harvested from AlloDerm and placed into the awaiting conjunctival bed.  This was sutured in place using double-armed 6-0 plain gut suture and the tails were externalized through the lateral lower lid.  The exact same procedure was performed on the left lower eyelid.     Finally, a blaise incision was made in the lateral aspect of the left lower lid, the lateral aspect of the tarsus and the suborbicularis oculi fat were each  approximated to the lateral orbital rim using 5-0 PDS suture.  The  incisions were then closed using 6-0 plain gut suture.      Antibiotic ointment was applied to the eye and incisions.  The Frost suture was left in place and taped using Steri-Strips to the forehead with instructions to be removed on Friday.     Finally Kybella was injected into the submental fat and Vollure to the lips.     The patient tolerated the procedure well and was transferred to the recovery area in stable condition.    Implant Name Type Inv. Item Serial No.  Lot No. LRB No. Used Action   GRAFT ALLODERM 2 X 4 MEDIUM - [id number] Human tissue implants GRAFT ALLODERM 2 X 4 MEDIUM  [employer] [id number] N/A 1 Implanted

## 2021-05-26 NOTE — PERIOPERATIVE NURSING NOTE
Kybella 4cc to [facility] lot [id number] exp 04/23  Vollure 1 cc lot [id number] exp 12/26/22

## 2021-05-26 NOTE — DISCHARGE INSTRUCTIONS
IRIS MACIAS M.D., F.A.C.S.                                  SPECIALIZING IN OCULOPLASTICS                             EYELID, LACRIMAL AND ORBITAL SURGERY     POST-OPERATIVE INSTRUCTIONS FOR EYELID SURGERY:    1. Avoid strenous exercise for one week.  No heavy lifting over 10 pounds.  2. Apply ice compresses to operated eye(s) at least four times a day for 7 days then start warm compresses twice a day for two weeks.   3.  If the surgeon has not placed a bandage over your surgical site, you may get your incisions wet.  You may shower but keep your back to the spray.  4. Remove the frost sutures on Friday or Saturday.     Remove the steristrips (tape) from your forehead.   Using scissors that have been cleaned with alcohol, cut one side of the loop (red line)   Next pull up from the opposite side (blue arrow) so that the suture comes out.     5. If severe bleeding, pain or loss of vision occurs call Dr. Macias/Dr. Murphy immediately at [phone].    MEDICATIONS: ALL MEDICATIONS HAVE BEEN PREVIOUSLY SENT TO THE PHARMACY    1. Apply antibiotic ointment twice a day to suture line(s).   2. Use eyewash in the morning to remove the residual ointment if needed.   3. Take pain medication as needed.  4. Instill tobradex eye drops twice daily into both eyes  5. Use artificial tears four times a day as needed for dry eyes.   6. No  aspirin for 3 days post operatively.     ADDITIONAL INSTRUCTIONS:    1.  Keep head in position higher than your heart, especially while sleeping.  2.  Protect all bruises from sunshine exposure.  If desired, take Arnica [place] sublingual tablets (an herbal supplement) as directed on package and eat pineapple; these items work naturally to treat bruising.  3.  You may experience temporary blurring of vision, swelling, and/or bruising.  Eyelid should not swell completely shut.  If both eyes were done, these things may be more pronounced on one side.   Symmetry will be assessed at your postop exams.  4. A follow-up appointment in one to three week(s) can be made at the time of surgery or can be scheduled by calling the office at [phone] with Dr. Vargas/Dr. Murphy    FOR ANY QUESTIONS OR CONCERNS, PLEASE CALL:                       [phone]                      OR                    [phone]      [address]  [address]                                                                         [address]  [place] [address]                                                [place] [address]

## 2021-05-26 NOTE — ANESTHESIA POSTPROCEDURE EVALUATION
Patient: Olya Barrow    Procedure Summary     Date: 05/26/21 Room / Location: [facility]    Anesthesia Start: 1423 Anesthesia Stop: 1635    Procedures:       Lid retraction repair transconj BUL marginal rotation BUL, cautery (Bilateral )      Canthoplasty BLL (Bilateral )      Ectropion repair BLL with alloderm graft , kybella and volure inj cpt 64999  peel (Bilateral ) Diagnosis:       Trichiasis without entropion, unspecified laterality      (trichiasis h02.59)      (kerasicca ou FESTUS h16.223/h05.00)    Surgeons: Li Vargas MD Responsible Provider: Jailyn Holliday MD    Anesthesia Type: MAC ASA Status: 2          Anesthesia Type: MAC  PACU Vitals    No data found in the last 10 encounters.       BP 97/50  HR 67  SpO2 97%    Anesthesia Post Evaluation    Pain management: adequate  Patient location during evaluation: PACU  Patient participation: complete - patient participated  Level of consciousness: awake and alert  Cardiovascular status: acceptable  Airway Patency: adequate  Respiratory status: acceptable  Hydration status: acceptable  Anesthetic complications: no

## 2021-05-26 NOTE — ANESTHESIOLOGIST PRE-PROCEDURE ATTESTATION
Pre-Procedure Patient Identification:  I am the Primary Anesthesiologist and have identified the patient on 05/26/21 at 1:27 PM.   I have confirmed the following procedure(s) Lid retraction repair transconj BUL marginal rotation BUL, cautery (B), Canthoplasty BLL (B), Ectropion repair BLL with graft , kybella and volure inj cpt 45209 (B) will be performed by the following surgeon/proceduralist Li Vargas MD.

## 2021-05-26 NOTE — H&P
PT seen and examined at bedside. Paper chart reviewed including pre-op H&P. There are no updates to the patient's H&P at this time.   Pt stable for surgery, proceed with procedure as planned.   Li Vargas MD

## 2021-05-26 NOTE — OR SURGEON
Pre-Procedure patient identification:  I am the primary operating surgeon/proceduralist and I have identified the patient on 05/26/21 at 1:24 PM Li Vargas MD  Phone Number: [phone]    Reports laceration to left 2nd and 3rd fingers. Onset just pta while walking outside. Unable to verbalize how occurred. Bleeding controlled on arrival. Tetanus up to date.

## 2022-08-16 ENCOUNTER — TELEPHONE (OUTPATIENT)
Dept: NEUROLOGY | Facility: CLINIC | Age: 37
End: 2022-08-16

## 2022-08-16 NOTE — TELEPHONE ENCOUNTER
PT called in to reestablish with our neurologists  PT has pins and needles she was seeing Dr Sukhdeep Bartlett for back in 2019   Made appt for 1/3/[place] with Dr Chintan Caballero @ 4:00 and placed PT on wait list

## 2022-12-29 ENCOUNTER — TELEPHONE (OUTPATIENT)
Dept: NEUROLOGY | Facility: CLINIC | Age: 37
End: 2022-12-29

## 2022-12-29 NOTE — TELEPHONE ENCOUNTER
THE [facility] to confirm patient's 1/3/2023 @ 4 pm appointment with Dr Arthea Meckel at the [facility]  Call back number given [phone]

## 2023-01-03 ENCOUNTER — TELEPHONE (OUTPATIENT)
Dept: NEUROLOGY | Facility: CLINIC | Age: 38
End: 2023-01-03

## 2023-01-03 NOTE — TELEPHONE ENCOUNTER
I called the patient and left a voicemail if she would be able to come in at 3 pm today as she had a cancellation  [facility] back line given

## 2023-01-12 ENCOUNTER — APPOINTMENT (OUTPATIENT)
Dept: PREADMISSION TESTING | Facility: HOSPITAL | Age: 38
End: 2023-01-12
Payer: COMMERCIAL

## 2023-01-18 ENCOUNTER — ANESTHESIA EVENT (OUTPATIENT)
Dept: SURGERY | Facility: HOSPITAL | Age: 38
Setting detail: HOSPITAL OUTPATIENT SURGERY
End: 2023-01-18
Payer: COMMERCIAL

## 2023-01-19 ENCOUNTER — TRANSCRIBE ORDERS (OUTPATIENT)
Dept: SCHEDULING | Age: 38
End: 2023-01-19

## 2023-01-19 DIAGNOSIS — Z11.59 ENCOUNTER FOR SCREENING FOR OTHER VIRAL DISEASES: Primary | ICD-10-CM

## 2023-01-19 DIAGNOSIS — E03.9 HYPOTHYROIDISM, UNSPECIFIED: ICD-10-CM

## 2023-01-19 DIAGNOSIS — Z20.822 CONTACT WITH AND (SUSPECTED) EXPOSURE TO COVID-19: ICD-10-CM

## 2023-01-20 ENCOUNTER — APPOINTMENT (OUTPATIENT)
Dept: LAB | Facility: CLINIC | Age: 38
End: 2023-01-20
Attending: OPHTHALMOLOGY
Payer: COMMERCIAL

## 2023-01-20 ENCOUNTER — APPOINTMENT (OUTPATIENT)
Dept: PREADMISSION TESTING | Facility: HOSPITAL | Age: 38
End: 2023-01-20
Payer: COMMERCIAL

## 2023-01-20 VITALS — HEIGHT: 65 IN | BODY MASS INDEX: 20.83 KG/M2 | WEIGHT: 125 LBS

## 2023-01-20 DIAGNOSIS — Z20.822 CONTACT WITH AND (SUSPECTED) EXPOSURE TO COVID-19: ICD-10-CM

## 2023-01-20 DIAGNOSIS — E03.9 HYPOTHYROIDISM, UNSPECIFIED: ICD-10-CM

## 2023-01-20 DIAGNOSIS — Z11.59 ENCOUNTER FOR SCREENING FOR OTHER VIRAL DISEASES: ICD-10-CM

## 2023-01-20 PROCEDURE — C9803 HOPD COVID-19 SPEC COLLECT: HCPCS

## 2023-01-20 PROCEDURE — 84703 CHORIONIC GONADOTROPIN ASSAY: CPT

## 2023-01-20 PROCEDURE — 36415 COLL VENOUS BLD VENIPUNCTURE: CPT

## 2023-01-20 PROCEDURE — 85027 COMPLETE CBC AUTOMATED: CPT

## 2023-01-20 PROCEDURE — U0003 INFECTIOUS AGENT DETECTION BY NUCLEIC ACID (DNA OR RNA); SEVERE ACUTE RESPIRATORY SYNDROME CORONAVIRUS 2 (SARS-COV-2) (CORONAVIRUS DISEASE [COVID-19]), AMPLIFIED PROBE TECHNIQUE, MAKING USE OF HIGH THROUGHPUT TECHNOLOGIES AS DESCRIBED BY CMS-2020-01-R: HCPCS

## 2023-01-20 RX ORDER — METOPROLOL SUCCINATE 25 MG/1
25 TABLET, EXTENDED RELEASE ORAL DAILY
COMMUNITY

## 2023-01-20 RX ORDER — ERGOCALCIFEROL 1.25 MG/1
50000 CAPSULE ORAL WEEKLY
COMMUNITY

## 2023-01-20 ASSESSMENT — PAIN SCALES - GENERAL: PAINLEVEL: 2

## 2023-01-20 NOTE — PRE-PROCEDURE INSTRUCTIONS
1.      You will be called between 3pm -7pm one business day before your procedure  January 24, 2023   to tell you where and when to report.  If you do not receive a phone call by 6pm, please call the Admissions office at [phone] to determine the arrival time for your procedure.      2.        Please report to Admissions or Short Procedure Unit, park in [address], on the day of your procedure.  Detailed directions will be given to you when you are called with arrival time.      3.      No solid food for EIGHT HOURS prior to surgery- No food after midnight.         NPO after midnight as per Dr. Vargas's instructions on eating and drinking     4.      Early on the morning of the procedure please take your usual dose of the listed medications with a sip of water: Tirosint and cytomel and metoprolol    No NSAIDs, Supplements, Vitamins from 1/20/23 until after surgery    May take Tylenol if needed.       5.      Other Instructions: You may brush your teeth the morning of the procedure. Rinse and spit, do not swallow.  Bring a list of your medications with dosages with you.  Use surgical wash as directed. CHG Scrub the night before and the morning of the procedure.   6.      If you develop a cold, cough, fever, rash, or other symptom prior to the data of the procedure, please report it to your physician immediately.     7.      If you need to cancel the procedure for any reason, please contact your physician.     8.      Make arrangements to have someone drive you home from the procedure. If you have not arranged for transportation home, your surgery may be cancelled.      9.      You may not take public transportation unless you are accompanied by a responsible person.     10.      You may not drive a car or operate complex or potentially dangerous machinery for 24 hours following anesthesia and/or sedation.      11.      12.      If it is medically necessary for you to have a longer stay, you will be informed as soon  as the decision is made.              Only bring essential items to the hospital.  Do not wear or bring anything of value to the hospital including jewelry of any kind, money, or wallet. Do not wear make-up or contact lenses. Do not BRING MEDICATIONS FROM HOME unless instructed to do so.  DO bring your hearing aids, glasses, and a case.        13.      No lotion, creams, powders, or oils on skin the morning of procedure        14.      Dress in comfortable clothes.     15.      If instructed, please bring a copy of your Advanced Directive (Living Will/Durable Power of ) on the day of your procedure.      16.      17.            18.       19.       Patients need to quarantine from the time of PAT COVID test to day of surgery, regardless of COVID vaccine status.         Ensuring your safety at all times is a very important part of out [facility] Culture of Safety . After having surgery and sedation, you are at risk for falling and balance issues.  Although you may feel awake, the effects of the medication can last up to 24 hours after anesthesia.  If you need to use the bathroom during your recovery period, nursing staff will escort you there and stay with you to ensure your safety.          Refrain from drinking alcohol and smoking cigarettes for 24 hours prior to surgery.         Shower with antibacterial soap (DIAL) the night before and morning of your procedure.  If your procedure indicates the need for CHG antiseptic was (Bactoshield or Hibiclens), please use this instead and follow instructions as discussed at the time of your Pre-Admission Testing visit or phone interview.     Above instructions reviewed with patient and patient acknowledges understanding.

## 2023-01-21 LAB
ERYTHROCYTE [DISTWIDTH] IN BLOOD BY AUTOMATED COUNT: 11.9 % (ref 11.7–14.4)
HCG UR QL: NEGATIVE
HCT VFR BLDCO AUTO: 38 % (ref 35–45)
HGB BLD-MCNC: 12.6 G/DL (ref 11.8–15.7)
MCH RBC QN AUTO: 30.6 PG (ref 28–33.2)
MCHC RBC AUTO-ENTMCNC: 33.2 G/DL (ref 32.2–35.5)
MCV RBC AUTO: 92.2 FL (ref 83–98)
PDW BLD AUTO: 10.8 FL (ref 9.4–12.3)
PLATELET # BLD AUTO: 234 K/UL (ref 150–369)
RBC # BLD AUTO: 4.12 M/UL (ref 3.93–5.22)
WBC # BLD AUTO: 6.62 K/UL (ref 3.8–10.5)

## 2023-01-22 LAB — SARS-COV-2 RNA RESP QL NAA+PROBE: NEGATIVE

## 2023-01-24 NOTE — DISCHARGE INSTRUCTIONS
IRIS MACIAS M.D., F.A.C.S.                                  SPECIALIZING IN OCULOPLASTICS                             EYELID, LACRIMAL AND ORBITAL SURGERY     POST-OPERATIVE INSTRUCTIONS FOR EYELID SURGERY:    1. Avoid strenous exercise for one week.  No heavy lifting over 10 pounds.  2. Apply ice compresses to operated eye(s) at least four times a day for 7 days then start warm compresses twice a day for two weeks.   3.  If the surgeon has not placed a bandage over your surgical site, you may get your incisions wet.  You may shower but keep your back to the spray.  4. If severe bleeding, pain or loss of vision occurs call Dr. Macias/Dr. Echols/Dr. Wagoner immediately at [phone].  5. You may remove the frost suture on Saturday Jan 28th as directed below.      Remove the steristrips (tape) from your forehead.   Using scissors that have been cleaned with alcohol, cut one side of the loop (line)   Next pull up from the opposite side (arrow) so that the suture comes out.       MEDICATIONS: ALL MEDICATIONS HAVE BEEN PREVIOUSLY SENT TO THE PHARMACY    1. Apply antibiotic ointment twice a day to suture line(s).   2. Use eyewash in the morning to remove the residual ointment if needed.   3. Take pain medication as needed.  4. Instill tobradex eye drops twice daily into both eyes  5. Use artificial tears four times a day as needed for dry eyes.   6. No  aspirin for 3 days post operatively.   7. If you were prescribed a medrol dose pack, use as directed. You may start tomorrow.    ADDITIONAL INSTRUCTIONS:    1.  Keep head in position higher than your heart, especially while sleeping.  2.  Protect all bruises from sunshine exposure.  If desired, take Arnica [place] sublingual tablets (an herbal supplement) as directed on package and eat pineapple; these items work naturally to treat bruising.  3.  You may experience temporary blurring of vision, swelling, and/or bruising.   Eyelid should not swell completely shut.  If both eyes were done, these things may be more pronounced on one side.  Symmetry will be assessed at your postop exams.  4. A follow-up appointment in one to three week(s) can be made at the time of surgery or can be scheduled by calling the office at [phone] with Dr. Vargas/Dr. Echols/Dr. Wagoner    FOR ANY QUESTIONS OR CONCERNS, PLEASE CALL:                       [phone]                      OR                    [phone]      [address]  [address]                                                                         [address]  [place] [address]                                                [place] [address]

## 2023-01-25 ENCOUNTER — HOSPITAL ENCOUNTER (OUTPATIENT)
Facility: HOSPITAL | Age: 38
Setting detail: HOSPITAL OUTPATIENT SURGERY
Discharge: HOME | End: 2023-01-25
Attending: OPHTHALMOLOGY | Admitting: OPHTHALMOLOGY
Payer: COMMERCIAL

## 2023-01-25 ENCOUNTER — ANESTHESIA (OUTPATIENT)
Dept: SURGERY | Facility: HOSPITAL | Age: 38
Setting detail: HOSPITAL OUTPATIENT SURGERY
End: 2023-01-25
Payer: COMMERCIAL

## 2023-01-25 VITALS
OXYGEN SATURATION: 95 % | RESPIRATION RATE: 17 BRPM | TEMPERATURE: 97.7 F | SYSTOLIC BLOOD PRESSURE: 130 MMHG | HEART RATE: 65 BPM | BODY MASS INDEX: 20.49 KG/M2 | HEIGHT: 65 IN | DIASTOLIC BLOOD PRESSURE: 72 MMHG | WEIGHT: 123 LBS

## 2023-01-25 PROCEDURE — 63600000 HC DRUGS/DETAIL CODE: Performed by: OPHTHALMOLOGY

## 2023-01-25 PROCEDURE — 25000000 HC PHARMACY GENERAL: Performed by: NURSE ANESTHETIST, CERTIFIED REGISTERED

## 2023-01-25 PROCEDURE — 63600000 HC DRUGS/DETAIL CODE: Performed by: ANESTHESIOLOGY

## 2023-01-25 PROCEDURE — 08SN0ZZ REPOSITION RIGHT UPPER EYELID, OPEN APPROACH: ICD-10-PCS | Performed by: OPHTHALMOLOGY

## 2023-01-25 PROCEDURE — 71000012 HC PACU PHASE 2 EA ADDL MIN: Performed by: OPHTHALMOLOGY

## 2023-01-25 PROCEDURE — 71000001 HC PACU PHASE 1 INITIAL 30MIN: Performed by: OPHTHALMOLOGY

## 2023-01-25 PROCEDURE — 25800000 HC PHARMACY IV SOLUTIONS: Performed by: STUDENT IN AN ORGANIZED HEALTH CARE EDUCATION/TRAINING PROGRAM

## 2023-01-25 PROCEDURE — 08SRXZZ REPOSITION LEFT LOWER EYELID, EXTERNAL APPROACH: ICD-10-PCS | Performed by: OPHTHALMOLOGY

## 2023-01-25 PROCEDURE — 27200000 HC STERILE SUPPLY: Performed by: OPHTHALMOLOGY

## 2023-01-25 PROCEDURE — 08QNXZZ REPAIR RIGHT UPPER EYELID, EXTERNAL APPROACH: ICD-10-PCS | Performed by: OPHTHALMOLOGY

## 2023-01-25 PROCEDURE — 71000002 HC PACU PHASE 2 INITIAL 30MIN: Performed by: OPHTHALMOLOGY

## 2023-01-25 PROCEDURE — 63600000 HC DRUGS/DETAIL CODE: Performed by: NURSE ANESTHETIST, CERTIFIED REGISTERED

## 2023-01-25 PROCEDURE — 37000001 HC ANESTHESIA GENERAL: Performed by: OPHTHALMOLOGY

## 2023-01-25 PROCEDURE — 25000000 HC PHARMACY GENERAL: Performed by: OPHTHALMOLOGY

## 2023-01-25 PROCEDURE — 36000013 HC OR LEVEL 3 EA ADDL MIN: Performed by: OPHTHALMOLOGY

## 2023-01-25 PROCEDURE — 08QQXZZ REPAIR RIGHT LOWER EYELID, EXTERNAL APPROACH: ICD-10-PCS | Performed by: OPHTHALMOLOGY

## 2023-01-25 PROCEDURE — 08SQXZZ REPOSITION RIGHT LOWER EYELID, EXTERNAL APPROACH: ICD-10-PCS | Performed by: OPHTHALMOLOGY

## 2023-01-25 PROCEDURE — 63700000 HC SELF-ADMINISTRABLE DRUG: Performed by: OPHTHALMOLOGY

## 2023-01-25 PROCEDURE — 71000011 HC PACU PHASE 1 EA ADDL MIN: Performed by: OPHTHALMOLOGY

## 2023-01-25 PROCEDURE — 36000003 HC OR LEVEL 3 INITIAL 30MIN: Performed by: OPHTHALMOLOGY

## 2023-01-25 RX ORDER — DEXTROSE 40 %
15-30 GEL (GRAM) ORAL AS NEEDED
Status: DISCONTINUED | OUTPATIENT
Start: 2023-01-25 | End: 2023-01-25 | Stop reason: HOSPADM

## 2023-01-25 RX ORDER — GLYCOPYRROLATE 0.6MG/3ML
SYRINGE (ML) INTRAVENOUS AS NEEDED
Status: DISCONTINUED | OUTPATIENT
Start: 2023-01-25 | End: 2023-01-25 | Stop reason: SURG

## 2023-01-25 RX ORDER — DEXTROSE 50 % IN WATER (D50W) INTRAVENOUS SYRINGE
25 AS NEEDED
Status: DISCONTINUED | OUTPATIENT
Start: 2023-01-25 | End: 2023-01-25 | Stop reason: HOSPADM

## 2023-01-25 RX ORDER — OXYCODONE AND ACETAMINOPHEN 5; 325 MG/1; MG/1
1 TABLET ORAL ONCE
Status: DISCONTINUED | OUTPATIENT
Start: 2023-01-25 | End: 2023-01-25 | Stop reason: HOSPADM

## 2023-01-25 RX ORDER — BUPIVACAINE HYDROCHLORIDE AND EPINEPHRINE 2.5; 5 MG/ML; UG/ML
INJECTION, SOLUTION EPIDURAL; INFILTRATION; INTRACAUDAL; PERINEURAL
Status: DISCONTINUED | OUTPATIENT
Start: 2023-01-25 | End: 2023-01-25 | Stop reason: HOSPADM

## 2023-01-25 RX ORDER — PROPARACAINE HYDROCHLORIDE 5 MG/ML
SOLUTION/ DROPS OPHTHALMIC
Status: DISCONTINUED | OUTPATIENT
Start: 2023-01-25 | End: 2023-01-25 | Stop reason: HOSPADM

## 2023-01-25 RX ORDER — BRIMONIDINE TARTRATE 2 MG/ML
SOLUTION/ DROPS OPHTHALMIC
Status: DISCONTINUED | OUTPATIENT
Start: 2023-01-25 | End: 2023-01-25 | Stop reason: HOSPADM

## 2023-01-25 RX ORDER — PROPOFOL 10 MG/ML
INJECTION, EMULSION INTRAVENOUS AS NEEDED
Status: DISCONTINUED | OUTPATIENT
Start: 2023-01-25 | End: 2023-01-25 | Stop reason: SURG

## 2023-01-25 RX ORDER — FENTANYL CITRATE 50 UG/ML
50 INJECTION, SOLUTION INTRAMUSCULAR; INTRAVENOUS EVERY 5 MIN PRN
Status: COMPLETED | OUTPATIENT
Start: 2023-01-25 | End: 2023-01-25

## 2023-01-25 RX ORDER — ONDANSETRON HYDROCHLORIDE 2 MG/ML
INJECTION, SOLUTION INTRAVENOUS AS NEEDED
Status: DISCONTINUED | OUTPATIENT
Start: 2023-01-25 | End: 2023-01-25 | Stop reason: SURG

## 2023-01-25 RX ORDER — IBUPROFEN 200 MG
16-32 TABLET ORAL AS NEEDED
Status: DISCONTINUED | OUTPATIENT
Start: 2023-01-25 | End: 2023-01-25 | Stop reason: HOSPADM

## 2023-01-25 RX ORDER — DEXTROSE 40 %
15-30 GEL (GRAM) ORAL AS NEEDED
Status: DISCONTINUED | OUTPATIENT
Start: 2023-01-25 | End: 2023-01-25 | Stop reason: SDUPTHER

## 2023-01-25 RX ORDER — MIDAZOLAM HYDROCHLORIDE 2 MG/2ML
INJECTION, SOLUTION INTRAMUSCULAR; INTRAVENOUS AS NEEDED
Status: DISCONTINUED | OUTPATIENT
Start: 2023-01-25 | End: 2023-01-25 | Stop reason: SURG

## 2023-01-25 RX ORDER — DEXAMETHASONE SODIUM PHOSPHATE 10 MG/ML
INJECTION INTRAMUSCULAR; INTRAVENOUS
Status: DISCONTINUED | OUTPATIENT
Start: 2023-01-25 | End: 2023-01-25 | Stop reason: HOSPADM

## 2023-01-25 RX ORDER — BACITRACIN ZINC AND POLYMYXIN B SULFATE 500; 10000 [USP'U]/G; [USP'U]/G
OINTMENT OPHTHALMIC
Status: DISCONTINUED | OUTPATIENT
Start: 2023-01-25 | End: 2023-01-25 | Stop reason: HOSPADM

## 2023-01-25 RX ORDER — DIPHENHYDRAMINE HCL 50 MG/ML
12.5 VIAL (ML) INJECTION ONCE
Status: DISCONTINUED | OUTPATIENT
Start: 2023-01-25 | End: 2023-01-25 | Stop reason: HOSPADM

## 2023-01-25 RX ORDER — ONDANSETRON HYDROCHLORIDE 2 MG/ML
4 INJECTION, SOLUTION INTRAVENOUS
Status: DISCONTINUED | OUTPATIENT
Start: 2023-01-25 | End: 2023-01-25 | Stop reason: HOSPADM

## 2023-01-25 RX ORDER — TRANEXAMIC ACID 100 MG/ML
INJECTION, SOLUTION INTRAVENOUS
Status: DISCONTINUED | OUTPATIENT
Start: 2023-01-25 | End: 2023-01-25 | Stop reason: HOSPADM

## 2023-01-25 RX ORDER — DEXTROSE 50 % IN WATER (D50W) INTRAVENOUS SYRINGE
25 AS NEEDED
Status: DISCONTINUED | OUTPATIENT
Start: 2023-01-25 | End: 2023-01-25 | Stop reason: SDUPTHER

## 2023-01-25 RX ORDER — FENTANYL CITRATE 50 UG/ML
INJECTION, SOLUTION INTRAMUSCULAR; INTRAVENOUS AS NEEDED
Status: DISCONTINUED | OUTPATIENT
Start: 2023-01-25 | End: 2023-01-25

## 2023-01-25 RX ORDER — LIDOCAINE HCL/EPINEPHRINE/PF 2%-1:200K
VIAL (ML) INJECTION
Status: DISCONTINUED | OUTPATIENT
Start: 2023-01-25 | End: 2023-01-25 | Stop reason: HOSPADM

## 2023-01-25 RX ORDER — SODIUM CHLORIDE 9 MG/ML
INJECTION, SOLUTION INTRAVENOUS CONTINUOUS
Status: DISCONTINUED | OUTPATIENT
Start: 2023-01-25 | End: 2023-01-25 | Stop reason: HOSPADM

## 2023-01-25 RX ORDER — LIDOCAINE HYDROCHLORIDE 10 MG/ML
INJECTION, SOLUTION EPIDURAL; INFILTRATION; INTRACAUDAL; PERINEURAL AS NEEDED
Status: DISCONTINUED | OUTPATIENT
Start: 2023-01-25 | End: 2023-01-25 | Stop reason: SURG

## 2023-01-25 RX ORDER — DEXAMETHASONE SODIUM PHOSPHATE 4 MG/ML
INJECTION, SOLUTION INTRA-ARTICULAR; INTRALESIONAL; INTRAMUSCULAR; INTRAVENOUS; SOFT TISSUE AS NEEDED
Status: DISCONTINUED | OUTPATIENT
Start: 2023-01-25 | End: 2023-01-25 | Stop reason: SURG

## 2023-01-25 RX ORDER — PHENYLEPHRINE HCL IN 0.9% NACL 1 MG/10 ML
SYRINGE (ML) INTRAVENOUS AS NEEDED
Status: DISCONTINUED | OUTPATIENT
Start: 2023-01-25 | End: 2023-01-25 | Stop reason: SURG

## 2023-01-25 RX ORDER — FENTANYL CITRATE 50 UG/ML
INJECTION, SOLUTION INTRAMUSCULAR; INTRAVENOUS AS NEEDED
Status: DISCONTINUED | OUTPATIENT
Start: 2023-01-25 | End: 2023-01-25 | Stop reason: SURG

## 2023-01-25 RX ORDER — CEFAZOLIN SODIUM 2 G/100ML
INJECTION, SOLUTION INTRAVENOUS AS NEEDED
Status: DISCONTINUED | OUTPATIENT
Start: 2023-01-25 | End: 2023-01-25 | Stop reason: SURG

## 2023-01-25 RX ORDER — IBUPROFEN 200 MG
16-32 TABLET ORAL AS NEEDED
Status: DISCONTINUED | OUTPATIENT
Start: 2023-01-25 | End: 2023-01-25 | Stop reason: SDUPTHER

## 2023-01-25 RX ADMIN — Medication 50 MCG: at 09:56

## 2023-01-25 RX ADMIN — MIDAZOLAM HYDROCHLORIDE 2 MG: 1 INJECTION, SOLUTION INTRAMUSCULAR; INTRAVENOUS at 08:59

## 2023-01-25 RX ADMIN — ONDANSETRON HYDROCHLORIDE 4 MG: 2 SOLUTION INTRAMUSCULAR; INTRAVENOUS at 08:59

## 2023-01-25 RX ADMIN — SODIUM CHLORIDE: 9 INJECTION, SOLUTION INTRAVENOUS at 08:13

## 2023-01-25 RX ADMIN — Medication 50 MCG: at 10:07

## 2023-01-25 RX ADMIN — GLYCOPYRROLATE 0.1 MG: 0.2 INJECTION, SOLUTION INTRAMUSCULAR; INTRAVITREAL at 08:58

## 2023-01-25 RX ADMIN — Medication 50 MCG: at 09:45

## 2023-01-25 RX ADMIN — DEXAMETHASONE SODIUM PHOSPHATE 8 MG: 4 INJECTION, SOLUTION INTRAMUSCULAR; INTRAVENOUS at 09:11

## 2023-01-25 RX ADMIN — FENTANYL CITRATE 50 MCG: 50 INJECTION, SOLUTION INTRAMUSCULAR; INTRAVENOUS at 09:08

## 2023-01-25 RX ADMIN — CEFAZOLIN SODIUM 2 G: 2 INJECTION, SOLUTION INTRAVENOUS at 09:11

## 2023-01-25 RX ADMIN — PROPOFOL 200 MG: 10 INJECTION, EMULSION INTRAVENOUS at 09:08

## 2023-01-25 RX ADMIN — LIDOCAINE HYDROCHLORIDE 5 ML: 10 INJECTION, SOLUTION EPIDURAL; INFILTRATION; INTRACAUDAL; PERINEURAL at 09:08

## 2023-01-25 RX ADMIN — FENTANYL CITRATE 50 MCG: 50 INJECTION, SOLUTION INTRAMUSCULAR; INTRAVENOUS at 10:54

## 2023-01-25 RX ADMIN — FENTANYL CITRATE 50 MCG: 50 INJECTION, SOLUTION INTRAMUSCULAR; INTRAVENOUS at 10:35

## 2023-01-25 ASSESSMENT — PAIN SCALES - GENERAL: PAIN_LEVEL: 0

## 2023-01-25 NOTE — ANESTHESIOLOGIST PRE-PROCEDURE ATTESTATION
Pre-Procedure Patient Identification:  I am the Primary Anesthesiologist and have identified the patient on 01/25/23 at 8:31 AM.   I have confirmed the procedure(s) will be performed by the following surgeon/proceduralist Li Vargas MD.

## 2023-01-25 NOTE — OR SURGEON
Pre-Procedure patient identification:  I am the primary operating surgeon/proceduralist and I have identified the patient on 01/25/23 at 8:55 AM Li Vargas MD  Phone Number: [phone]

## 2023-01-25 NOTE — OP NOTE
Preoperative Diagnoses:  1. Thyroid Eye Disease  2. Myogenic Ptosis of Right Upper Eyelid  3. 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    Postoperative Diagnoses:   same    Procedures:  1. Bilateral Canthoplasty of the   Lower Eyelid  2. Muellerectomy Right Upper Lid  3. Conjunctival autograft from Right Upper Lid to Right Lower Lid with Tisseal glue  4. Frost Suture of the Right Lower Eyelid  5. Dexamethasone injection of the Right Lower Eyelid.    EBL: <1cc    Complications: None    Indication: The patient has upper lid ptosis of the right eye and bilateral lower lid retraction secondary to thyroid eye disease causing a significant decrease in vision due to exposure keratopathy.    Procedure: In the pre-operative holding area, the risks, benefits, and alternatives of the above procedures were again discussed in detail with the patient. All questions were answered. Informed consent was obtained and placed into the medical record.    In the operating room, the patient was identified by name and date of birth. Monitored anesthesia care was initiated by the anesthesiology team, and the patient was marked and prepped in the usual fashion for ophthalmic plastic surgery. Approximately 2cc of local anesthetic solution (2% lidocaine with 1:100,000 epinephrine) was injected into the right upper and lower eyelids, as well as both lateral canthal regions.    Attention was first directed to the patient’s right side, where the upper eyelid was everted. A caliper was used to identify 6 mm of ordaz's muscle which was subsequently resected. A conjunctival graft was created from the resected tissue and set aside. Hemostasis was achieved using a combination of Gelfoam soaked in thrombin, as well as bipolar cautery.     Next, a scleral shell was placed into the right eye and a 4-0 silk traction suture was placed through the right lower lid margin.A transconjunctival incision was made to  create a bed for the conjunctival autograft from the upper lid. The graft was placed into the bed and Tisseal was placed overlying the graft while ensuring proper positioning. A Lateral canthotomy with inferior cantholysis was then performed on both sides. Proper positioning of the lateral canthal regions was reapproximated using 5-0 PDS sutures, which was further reinforced with superior and lateral transposition of the sub-orbicularis oculi fat. The overlying skin incisions were then closed with 6-0 plain gut. 1 cc of 10 mg dexamethasone was then injection into the right lower eyelid.    At the conclusion of the case, the scleral shell was removed. The silk traction suture from the right lower eyelid was then affixed to the upper forehead with steri-strips and a pressure patch was placed over the right eye.    Antibiotic ointment was then applied to both the operative sites and inside the eyes. The patient tolerated the procedure well, there were no complications, and was transferred to the recovery area in stable condition.    .

## 2023-01-25 NOTE — H&P
Patient identified by name and  in the pre-op holding area.   Paper chart H&P reviewed, pt confirms no changes since.   Allergies reviewed and updated.   Patient marked and consented.     Li Vargas MD  and  Li Vargas MD  Phone Number: [phone]

## 2023-01-25 NOTE — ANESTHESIA PROCEDURE NOTES
Airway  Urgency: elective    Start Time: 1/25/2023 9:11 AM  Airway not difficult    General Information and Staff    Patient location during procedure: OR  Anesthesiologist: Ken Hall MD  Resident/CRNA: Doe Barreto CRNA  Performed: resident/CRNA     Indications and Patient Condition  Indications for airway management: anesthesia  Sedation level: deep  Preoxygenated: yes  Patient position: sniffing  Mask difficulty assessment: 1 - vent by mask    Final Airway Details  Final airway type: supraglottic airway      Successful airway: iGel  Size 3     Number of attempts at approach: 1  Ventilation between attempts: none  Number of other approaches attempted: 0  Atraumatic airway insertion

## 2023-01-25 NOTE — ANESTHESIA POSTPROCEDURE EVALUATION
Patient: Olya Barrow    Procedure Summary     Date: 01/25/23 Room / Location: [facility] / [facility]    Anesthesia Start: 0901 Anesthesia Stop: 1030    Procedures:       Mullerectomy right upper lid, Correction of lid retraction right lower lid with graft, canthoplasty BLL's (Right)      Canthoplasty both lower lids (Bilateral) Diagnosis:       Thyrotoxic exophthalmos      Proptosis      (crystal e05.00)      (h05.20 proptosis)    Surgeons: Li Vargas MD Responsible Provider: Ken Hall MD    Anesthesia Type: general ASA Status: 2          Anesthesia Type: general  PACU Vitals  1/25/2023 1025 - 1/25/2023 1125      1/25/2023  1027 1/25/2023  1030 1/25/2023  1045 1/25/2023  1100    BP: 128/72 125/71 123/61 121/61    Temp: 36.9 °C (98.5 °F) -- -- --    Pulse: 85 82 74 68    Resp: 16 16 24 23    SpO2: 99 % 99 % 96 % 95 %              1/25/2023  1115             BP: 126/68       Temp: --       Pulse: 77       Resp: 15       SpO2: 97 %               Anesthesia Post Evaluation    Pain score: 0  Pain management: satisfactory to patient  Mode of pain management: IV medication  Patient location during evaluation: PACU  Patient participation: complete - patient participated  Level of consciousness: awake and alert  Cardiovascular status: acceptable  Airway Patency: adequate  Respiratory status: acceptable  Hydration status: stable  Anesthetic complications: no

## 2023-01-25 NOTE — ANESTHESIA PREPROCEDURE EVALUATION
Relevant Problems   No relevant active problems       Anesthesia ROS/MED HX      Endo/Other   Hypothyroidism  Body Habitus: Normal  ROS/MED HX Comments:    Endo: Hx multiple eye surgeries        Past Surgical History:   Procedure Laterality Date   • ORBITAL RECONSTRUCTION     • POLYPECTOMY     • WISDOM TOOTH EXTRACTION      hx of       Physical Exam    Airway   Mallampati: II   TM distance: >3 FB   Neck ROM: full  Cardiovascular    Rhythm: regular   Rate: normalPulmonary    Decreased breath sounds        Anesthesia Plan    Plan: general    Technique: general LMA     Airway: natural airway / supplemental oxygen   ASA 2  Anesthetic plan and risks discussed with: patient  Postop Plan:   Patient Disposition: phase II then home   Pain Management: IV analgesics

## 2023-05-19 ENCOUNTER — LAB REQUISITION (OUTPATIENT)
Dept: LAB | Facility: HOSPITAL | Age: 38
End: 2023-05-19
Attending: OPHTHALMOLOGY
Payer: COMMERCIAL

## 2023-05-19 DIAGNOSIS — H10.011 ACUTE FOLLICULAR CONJUNCTIVITIS, RIGHT EYE: ICD-10-CM

## 2023-05-19 PROCEDURE — 87070 CULTURE OTHR SPECIMN AEROBIC: CPT | Performed by: OPHTHALMOLOGY

## 2023-05-23 LAB
GRAM STN SPEC: ABNORMAL
GRAM STN SPEC: ABNORMAL
MICROORGANISM SPEC CULT: ABNORMAL

## 2024-05-22 PROCEDURE — 87070 CULTURE OTHR SPECIMN AEROBIC: CPT | Performed by: PHYSICIAN ASSISTANT

## 2024-05-22 PROCEDURE — 87077 CULTURE AEROBIC IDENTIFY: CPT | Performed by: PHYSICIAN ASSISTANT

## 2024-05-22 PROCEDURE — 87205 SMEAR GRAM STAIN: CPT | Performed by: PHYSICIAN ASSISTANT

## 2024-05-28 ENCOUNTER — E-CONSULT (OUTPATIENT)
Dept: OTHER | Facility: HOSPITAL | Age: 39
End: 2024-05-28
Payer: COMMERCIAL

## 2024-05-28 DIAGNOSIS — R09.81 NASAL SINUS CONGESTION: Primary | ICD-10-CM

## 2024-05-28 PROCEDURE — 99446 NTRPROF PH1/NTRNET/EHR 5-10: CPT | Performed by: INTERNAL MEDICINE

## 2024-05-28 NOTE — PROGRESS NOTES
E-Consult  Nhung Sorensen 39 y.o. female MRN: [id number]  Encounter Date: 05/28/24    -->Please note that this is an informational only consult being provided directly to the requesting provider and not to the patient. The recommendations in this note do not constitute any active orders.  Information gathered and recommendations provided based on chart review.  If there are further concerns, questions or need for the patient to be formally seen please contact me directly to discuss.     Reason for Consult / Principal Problem: Positive nasal cultures    Consulting Provider: Maria Alejandra Bae MD    Requesting Provider: Nhung Dobson*     Briefly this is a 39-year-old female with underlying Graves' disease with thyroid eye disease.  In 2019 she underwent left orbital decompression and was treated for complicating infection at outside hospital.  She also has documented Raynaud's and vitamin D deficiency.  The patient was actually seen by [facility] infectious disease in March for a positive wound culture with Pseudomonas and question of pseudomonal folliculitis.  This was ultimately felt to be colonization and that the patient may in fact have rosacea.  She was recommended for follow-up.  In our network she follows chronically with ENT.  She seems to be documented as having eustachian tube dysfunction on the left.  At the time she was reporting intermittent symptoms of feeling like the left ear was blocked and requires intermittent removal of cerumen.  She was also documented as having issues with TMJ.  She most recently saw ENT last week as she was having 2 weeks of nasal congestion and this concern for bloody/green rhinorrhea and the sensation that her left ear was clogged.  Based on the note it does not appear that she was using any local/symptomatic measures due to underlying anxiety and concern for possible side effects.  There is no extensive history of sinus infections or sinus surgeries in the past.   Patient was questioning if this could be seasonal or environmental allergies.  Reviewed exam with ENT provider today and patient had some cloudy drainage noted on nasal exam which was collected near the right middle meatus closer towards the drainage of the maxillary sinus.  Cultures ultimately isolated corynebacterium.  It seems the conversation led towards following up cultures and if ultimately negative then avoiding treatment as again patient is hesitant to pursue additional interventions.  On discussion with ENT provider today we reviewed the possibility that current cultures may just represent colonization.  We discussed reaching out to patient to see if she is having further symptoms, attempting local care and considering further imaging and pursuing antibiotics if patient ultimately fails to improve.  We are asked to comment on potential antibiotic options and dosing.    ASSESSMENT:  1.  Positive nasal culture with corynebacterium.  Patient at this time is without any systemic symptoms reported including fevers, headaches or chills.  She seems to have symptoms of congestion.  Local measures have not been attempted as of yet due to patient's concern for potential side effects.  Current cultures showing corynebacterium which I would question if these represent colonization.  Unfortunately drug resistance is known with these organisms and patient's allergy list reviewed also limiting options.  Case discussed with ENT provider.    RECOMMENDATIONS:  -At this time would recommend initiation of local care including Flonase and nasal rinses  -Consider starting a standing antihistamine as well  -If patient fails to improve would obtain CT imaging of the sinuses  -If patient does have extensive disease can obtain repeat cultures and based on these current cultures could start the patient on linezolid 600 mg every 12 hours orally for 7 days  -Would not recommend prolonged courses of antibiotics for this condition but  also with this antibiotic given side effects associated with prolonged use.  -Would verify patient is not pregnant prior to starting the medication  -Additionally would verify any updates to medication list and drug interactions prior to starting this medication.  Please ensure patient is not on any over-the-counter supplements.    -Current drug interaction and check in epic only mentions ruling out if patient may be pregnant and also a low potential reaction with metoprolol. Recommendations mention lowering metoprolol dose if linezolid started and so would discuss with her PCP. This is to avoid episodes of significant bradycardia.   -And patient should continue local measures even if antibiotics are started  -Recommend patient keep her follow-up appointment with [facility] infectious disease in July otherwise.    No formal follow-up required in our office at this time.  Please call if further questions.    Total time spent 5-10 minutes, >50% of the total time devoted to medical consultative verbal/EMR discussion between providers. Written report will be generated in the EMR. .

## 2024-07-18 ENCOUNTER — OFFICE VISIT (OUTPATIENT)
Dept: NEUROLOGY | Facility: CLINIC | Age: 39
End: 2024-07-18
Payer: COMMERCIAL

## 2024-07-18 VITALS
SYSTOLIC BLOOD PRESSURE: 118 MMHG | WEIGHT: 107.4 LBS | BODY MASS INDEX: 17.9 KG/M2 | DIASTOLIC BLOOD PRESSURE: 68 MMHG | TEMPERATURE: 98.2 F | HEIGHT: 65 IN

## 2024-07-18 DIAGNOSIS — I67.1 INTRACRANIAL ANEURYSM: Primary | ICD-10-CM

## 2024-07-18 PROCEDURE — 99203 OFFICE O/P NEW LOW 30 MIN: CPT

## 2024-07-18 RX ORDER — ALPHA LIPOIC ACID 300 MG
1 CAPSULE ORAL DAILY
COMMUNITY

## 2024-07-18 NOTE — PROGRESS NOTES
[facility]rology Conc[facility] Headache [facility] Consult  PATIENT:  Nhung Sorensen  MRN:  [id number]  :  1985  DATE OF SERVICE:  2024  REFERRED BY: Self, Referral  PMD: Lis Reid MD    Assessment/Plan:     Nhung Sorensen is a very pleasant 39 y.o. female with a past medical history that includes Graves disease, status post thyroidectomy, hyperthyroidism referred here for evaluation of headache.    Initial evaluation 2024    Intracranial aneurysm:    - Ambulatory referral placed to neurosurgery at [facility].  Patient was found to have 2 small intracranial aneurysms on a most recent MRI of the brain.  Patient is noted to have an upcoming CTA had with and without contrast within the next day or so.  The patient is trying to currently schedule an appointment with MILKA Eduardo for neurosurgery and patient already has a neurosurgery consultation set up at [facility].  Although, did place a referral to neurosurgery at [facility] to see if the patient would like to have a second opinion.  Advised the patient to have all imaging that is needed uploaded into the system.  Especially the most recent CTA head with and without contrast that she will have.  - Advised patient to follow-up with primary care provider in regards to providing prophylactic steroids for CTA head with and without contrast upcoming.  Patient is not sure if she has a contrast allergy or not, advised that she should follow-up with her primary care who ordered the imaging to see if she would provide her with prophylactic steroids to take prior to the imaging.  - The patient notes that she does not have high blood pressure and that she does not smoke at this time.  Advised the patient to avoid things such as tobacco usage which could potentially worsen size of any aneurysm.  Also, advised patient to monitor blood pressure as well, but at this time she does not historically have a past history of having high blood pressure.  - Advised  the patient that she can follow-up with [facility] neurology on an as-needed basis.  If her headaches do become more significant or do seem to become more of a problem for her in the future, she can certainly come back to the office and we can discuss them in more detail.  Or if her headaches continue to persist then neurosurgery does not feel as though it is related to her aneurysm, she can also come back for further evaluation as well.    Advised patient to follow-up on an as-needed basis with myself at [facility].  If she has any questions or concerns, she can reach out to me by sending me a [facility] message or she can call the office to get in contact with me as well.     CC:   We had the pleasure of evaluating Nhung Sorensen in neurological consultation today. Nhung Sorensen is a  39 y.o. female who presents today for evaluation of headaches.     History obtained from patient as well as available medical record review.  History of Present Illness:   Current medical illnesses  or past medical history include Graves disease, status post thyroidectomy, hyperthyroidism      Interval History:    Patient reported that she is presenting today and MRI head on 06/06/2024 had a [facility] screening.  The patient had brought in the report from the screening which had showed 2 small intracranial aneurysms.  The patient is going for a CT angiogram tomorrow for further evaluation of the the intracranial aneurysms.  The patient has been calling around trying to schedule a neurosurgery appointment.  Is noted that the patient has an appointment with [facility] neurosurgery on 08/08/2024.  The patient had also called over to [facility] for a new patient neurosurgery consultation as well.  The patient had come to [facility] neurology in hopes that maybe she could be evaluated for the intracranial aneurysms as well.  The patient stated that she had seen Dr. Bucio back in 2019 for unrelated issues of imbalance and  also muscle twitching as well.  The patient stated that she did not continue to have any concerns from a neurologic perspective, so she did not follow-up with [facility] neurology again.    She does thyroid eye disease and Grave's disease. Sometimes waking up and having a headache, hard to turn head and neck. Headaches occasionally occurring due to soreness in the neck. She would have headaches intermittently about every couple months for a few days. Waking up with the headache and this would continue for a few days in a row. Patient states that she would 2 Tylenol for the headache and would usually seem to go away. No migraine headaches that she notes. She had bad headaches in the past, but nothing as of late she states.  The patient states that she does not have any known history of hypertension at this time.  Her blood pressure today in the office was 118/68 and the patient states that her blood pressure is usually around this range.  The patient does not report any smoking history or any other significant risk factors that would be contributing factors to intracranial aneurysms.  Patient states that her maternal grandmother had an aortic aneurysm, but does not report any other significant intracranial aneurysms noted in her family.    The following portions of the patient's history were reviewed and updated as appropriate: allergies, current medications, past family history, past medical history, past social history, past surgical history and problem list.    Pertinent family history:  Family history of headaches:  no known family members with significant headaches  Any family history of aneurysms - Yes, maternal grandmother had an aortic aneurysm. She does not note any significant intracranial aneurysms in her family.     Pertinent social history:  Work: health insurance sales  Education: college  Lives with      Illicit Drugs: denies  Alcohol/tobacco: Denies tobacco use, alcohol intake: social  drinker    Past Medical History:     Past Medical History:   Diagnosis Date    Graves disease     s/p [id number]    Hypertension     Hyperthyroidism        Patient Active Problem List   Diagnosis    Hematuria    Cystitis       Medications:      Current Outpatient Medications   Medication Sig Dispense Refill    Alpha-Lipoic Acid 300 MG CAPS Take 1 capsule by mouth daily      B COMPLEX VITAMINS PO Take 1 tablet by mouth      cholecalciferol (VITAMIN D3) 1,000 units tablet Take 50,000 Units by mouth daily      levothyroxine 75 mcg tablet levothyroxine 75 mcg tablet      liothyronine (CYTOMEL) 25 mcg tablet liothyronine 25 mcg tablet      metoprolol succinate (TOPROL-XL) 25 mg 24 hr tablet Take 25 mg by mouth daily      Probiotic Product (PROBIOTIC DAILY PO) Take by mouth      RESTASIS 0.05 % ophthalmic emulsion INSTILL 1 DROP IN AFFECTED EYE TWICE DAILY.  0    TIROSINT 75 MCG CAPS   0    azelastine (ASTELIN) 0.1 % nasal spray 2 sprays into each nostril 2 (two) times a day (Patient not taking: Reported on 3/12/2024) 2 Bottle 6    betamethasone, augmented, (DIPROLENE) 0.05 % ointment every 24 hours      clindamycin (CLEOCIN T) 1 % lotion clindamycin 1 % lotion (Patient not taking: Reported on 6/21/2022)      doxycycline hyclate (VIBRAMYCIN) 100 mg capsule Take 100 mg by mouth 2 (two) times a day (Patient not taking: Reported on 6/21/2022)  0    fluconazole (DIFLUCAN) 200 mg tablet TAKE 1 TABLET BY MOUTH ONCE DAILY FOR 14 DAYS (Patient not taking: Reported on 3/12/2024)  0    gabapentin (NEURONTIN) 300 mg capsule Take 1 capsule (300 mg total) by mouth daily at bedtime (Patient not taking: Reported on 12/17/2019) 30 capsule 0    naltrexone (REVIA) 50 mg tablet Take 1 mg by mouth daily (Patient not taking: Reported on 3/12/2024)      nitrofurantoin (MACROBID) 100 mg capsule Take 1 capsule (100 mg total) by mouth 2 (two) times a day (Patient not taking: Reported on 6/16/2020) 10 capsule 0     No current facility-administered  medications for this visit.        Allergies:      Allergies   Allergen Reactions    Iodinated Contrast Media Other (See Comments)     lodine not good with graves    Vancomycin Itching    Doxycycline Rash     folliculitis rash, several different episodes    Epinephrine Other (See Comments) and Palpitations     Rapid heart rate; elevated BP;lightheaded    Irregular heartbeat    Medical Tape Rash    Nickel Other (See Comments) and Rash       Family History:     Family History   Problem Relation Age of Onset    Hypertension Mother     Nephrolithiasis Father     Hypertension Paternal Grandmother     Breast cancer Paternal Grandmother     Diabetes Paternal Grandfather     Transient ischemic attack Paternal Grandfather        Social History:       Social History     Socioeconomic History    Marital status: /Civil Union     Spouse name: Not on file    Number of children: Not on file    Years of education: Not on file    Highest education level: Not on file   Occupational History    Occupation: sales   Tobacco Use    Smoking status: Never    Smokeless tobacco: Never   Vaping Use    Vaping status: Never Used   Substance and Sexual Activity    Alcohol use: Yes     Comment: occasional    Drug use: Never    Sexual activity: Yes     Partners: Male     Birth control/protection: None   Other Topics Concern    Not on file   Social History Narrative    Consumes 3 cups of coffee a week     Social Determinants of Health     Financial Resource Strain: Not on file   Food Insecurity: Not on file   Transportation Needs: Not on file   Physical Activity: Not on file   Stress: Not on file   Social Connections: Unknown (6/18/2024)    Received from [facility]    Social [place]     How often do you feel lonely or isolated from those around you? (Adult - for ages 18 years and over): Not on file   Intimate Partner Violence: Not on file   Housing Stability: Not on file         Objective:     Physical Exam:                                  "                                Vitals:            Constitutional:    /68 (BP Location: Left arm, Patient Position: Sitting, Cuff Size: Adult)   Temp 98.2 °F (36.8 °C) (Temporal)   Ht 5' 5\" (1.651 m)   Wt 48.7 kg (107 lb 6.4 oz)   BMI 17.87 kg/m²   BP Readings from Last 3 Encounters:   07/18/24 118/68   05/22/24 110/70   03/12/24 120/80     Pulse Readings from Last 3 Encounters:   05/22/24 68   03/12/24 76   08/30/22 68         Well developed, well nourished, well groomed. No dysmorphic features.       Psychiatric:  Normal behavior and appropriate affect        Neurological Examination:     Mental status/cognitive function:   Orientated to time, place and person. Recent and remote memory intact. Attention span and concentration as well as fund of knowledge are appropriate for age. Normal language and spontaneous speech.    Cranial Nerves:  II-visual fields full.  III, IV, VI-Pupils were equal, round, and reactive to light and accomodation. Extraocular movements were full and conjugate without nystagmus. Conjugate gaze, normal smooth pursuits, normal saccades   V-facial sensation symmetric.    VII-facial expression symmetric, intact forehead wrinkle, strong eye closure, symmetric smile    VIII-hearing grossly intact bilaterally   IX, X-palate elevation symmetric, no dysarthria.   XI-shoulder shrug strength intact    XII-tongue protrusion midline.    Motor Exam: symmetric bulk and tone throughout, no pronator drift. Power/strength 5/5 bilateral upper and lower extremities, no atrophy, fasciculations or abnormal movements noted.   Sensory: grossly intact light touch in all extremities.   Reflexes: brachioradialis 2+, biceps 2+, knee 2+ bilaterally  Coordination: Finger nose finger intact bilaterally, no apparent dysmetria, ataxia or tremor noted  Gait: steady casual and tandem gait.         Pertinent Imaging:     MRI brain without contrast, 04/01/2019:    Impression:     No brain abnormality seen.      "   Review of Systems:     Review of Systems   Constitutional:  Negative for appetite change, fatigue and fever.   HENT: Negative.  Negative for hearing loss, tinnitus, trouble swallowing and voice change.    Eyes: Negative.  Negative for photophobia, pain and visual disturbance.   Respiratory: Negative.  Negative for shortness of breath.    Cardiovascular: Negative.  Negative for palpitations.   Gastrointestinal: Negative.  Negative for nausea and vomiting.   Endocrine: Negative.  Negative for cold intolerance.   Genitourinary: Negative.  Negative for dysuria, frequency and urgency.   Musculoskeletal:  Negative for back pain, gait problem, myalgias, neck pain and neck stiffness.   Skin: Negative.  Negative for rash.   Allergic/Immunologic: Negative.    Neurological:  Positive for headaches (Occasional). Negative for dizziness, tremors, seizures, syncope, facial asymmetry, speech difficulty, weakness, light-headedness and numbness.   Hematological: Negative.  Does not bruise/bleed easily.   Psychiatric/Behavioral: Negative.  Negative for confusion, hallucinations and sleep disturbance.    All other systems reviewed and are negative.     I have spent 30 minutes with the patient today in which greater than 50% of this time was spent in counseling/coordination of care regarding Patient and family education, Risk factor reductions, Impressions, Counseling / Coordination of care, Documenting in the medical record, Reviewing / ordering tests, medicine, procedures  , and Obtaining or reviewing history  . I also spent 10 minutes non face to face for this patient the same day.     Activity Minutes   Precharting/reviewing 5   Patient care/counseling 30   Postcharting/care coordination 5       Author:  Mitchell Gerardo PA-C 7/18/2024 8:13 AM

## 2024-07-18 NOTE — PROGRESS NOTES
ROS:    Review of Systems   Constitutional:  Negative for appetite change, fatigue and fever.   HENT: Negative.  Negative for hearing loss, tinnitus, trouble swallowing and voice change.    Eyes: Negative.  Negative for photophobia, pain and visual disturbance.   Respiratory: Negative.  Negative for shortness of breath.    Cardiovascular: Negative.  Negative for palpitations.   Gastrointestinal: Negative.  Negative for nausea and vomiting.   Endocrine: Negative.  Negative for cold intolerance.   Genitourinary: Negative.  Negative for dysuria, frequency and urgency.   Musculoskeletal:  Negative for back pain, gait problem, myalgias, neck pain and neck stiffness.   Skin: Negative.  Negative for rash.   Allergic/Immunologic: Negative.    Neurological:  Positive for headaches (Occasional). Negative for dizziness, tremors, seizures, syncope, facial asymmetry, speech difficulty, weakness, light-headedness and numbness.   Hematological: Negative.  Does not bruise/bleed easily.   Psychiatric/Behavioral: Negative.  Negative for confusion, hallucinations and sleep disturbance.    All other systems reviewed and are negative.

## 2024-09-06 ENCOUNTER — TELEPHONE (OUTPATIENT)
Age: 39
End: 2024-09-06

## 2024-09-06 NOTE — TELEPHONE ENCOUNTER
Pt called in regards to a CURTIS   Pt see's [facility] 7/18/24   Pt is asking to see DR. Brar.  Pt was seen in [facility] and has develop new issues with her vision.   Pt seen her eye doctor and was referred to see a neurologist.       Do you agree?     Please assist.  [phone]

## (undated) DEVICE — COVER LIGHTHANDLE

## (undated) DEVICE — CORD BI-POLAR DISP STERILE

## (undated) DEVICE — NEEDLE DISP HYPO 25GX5/8IN

## (undated) DEVICE — SUTURE SILK 6-0 769G

## (undated) DEVICE — GLOVE PROTEXIS LATEX MICRO 6.5

## (undated) DEVICE — ***USE 138647*** SUTURE VICRYL 5-0  J503G

## (undated) DEVICE — APPLICATOR COTTON TIP STER 6IN MEDC 2/PK

## (undated) DEVICE — SOLN IRRIG .9%SOD 250ML

## (undated) DEVICE — ***USE 144827*** MASK SWISS THERAPY

## (undated) DEVICE — SPONGE SURGIFOAM ABS GELATIN 12-7 TEMP CONTROLLED 59-86 F

## (undated) DEVICE — CONTAINER SPECIMEN STERILE 5OZ

## (undated) DEVICE — Device

## (undated) DEVICE — MARKER SURGICAL SKIN FINE

## (undated) DEVICE — ***USE 138605*** SUTURE SILK  4-0  783G

## (undated) DEVICE — SUTURE PDS II 5-0 P-2 Z503G

## (undated) DEVICE — TISSEEL VH S/D KIT 4ML

## (undated) DEVICE — SYRINGE DISP LUER-LOK  5 CC

## (undated) DEVICE — DRESSING SPONGE GAUZE 2X2 STER 50/BX

## (undated) DEVICE — TIP SUCTION FRAZIER 12FR

## (undated) DEVICE — CONTAINER SPECIMEN 60 ML

## (undated) DEVICE — GOWN SURGICAL REINFORCED LARGE

## (undated) DEVICE — SUTURE PLAIN GUT 6-0  770G

## (undated) DEVICE — BLADE SCALPEL #15